# Patient Record
Sex: FEMALE | Race: BLACK OR AFRICAN AMERICAN | NOT HISPANIC OR LATINO | Employment: FULL TIME | URBAN - METROPOLITAN AREA
[De-identification: names, ages, dates, MRNs, and addresses within clinical notes are randomized per-mention and may not be internally consistent; named-entity substitution may affect disease eponyms.]

---

## 2020-11-17 ENCOUNTER — TELEPHONE (OUTPATIENT)
Dept: ORTHOPEDICS | Facility: CLINIC | Age: 25
End: 2020-11-17

## 2023-07-13 ENCOUNTER — LAB VISIT (OUTPATIENT)
Dept: LAB | Facility: HOSPITAL | Age: 28
End: 2023-07-13
Attending: INTERNAL MEDICINE
Payer: MEDICAID

## 2023-07-13 ENCOUNTER — OFFICE VISIT (OUTPATIENT)
Dept: RHEUMATOLOGY | Facility: CLINIC | Age: 28
End: 2023-07-13
Payer: MEDICAID

## 2023-07-13 VITALS
BODY MASS INDEX: 38.56 KG/M2 | WEIGHT: 217.63 LBS | HEIGHT: 63 IN | DIASTOLIC BLOOD PRESSURE: 89 MMHG | SYSTOLIC BLOOD PRESSURE: 137 MMHG | HEART RATE: 82 BPM

## 2023-07-13 DIAGNOSIS — M32.9 SYSTEMIC LUPUS ERYTHEMATOSUS, UNSPECIFIED SLE TYPE, UNSPECIFIED ORGAN INVOLVEMENT STATUS: Primary | ICD-10-CM

## 2023-07-13 DIAGNOSIS — M32.9 SYSTEMIC LUPUS ERYTHEMATOSUS, UNSPECIFIED SLE TYPE, UNSPECIFIED ORGAN INVOLVEMENT STATUS: ICD-10-CM

## 2023-07-13 DIAGNOSIS — M25.50 ARTHRALGIA, UNSPECIFIED JOINT: ICD-10-CM

## 2023-07-13 DIAGNOSIS — K21.9 GASTROESOPHAGEAL REFLUX DISEASE, UNSPECIFIED WHETHER ESOPHAGITIS PRESENT: ICD-10-CM

## 2023-07-13 DIAGNOSIS — Z79.899 HIGH RISK MEDICATION USE: ICD-10-CM

## 2023-07-13 DIAGNOSIS — R53.83 FATIGUE, UNSPECIFIED TYPE: ICD-10-CM

## 2023-07-13 DIAGNOSIS — G89.4 CHRONIC PAIN SYNDROME: ICD-10-CM

## 2023-07-13 DIAGNOSIS — L30.9 ECZEMA, UNSPECIFIED TYPE: ICD-10-CM

## 2023-07-13 LAB
25(OH)D3+25(OH)D2 SERPL-MCNC: 11 NG/ML (ref 30–96)
ALBUMIN SERPL BCP-MCNC: 4 G/DL (ref 3.5–5.2)
ALP SERPL-CCNC: 73 U/L (ref 55–135)
ALT SERPL W/O P-5'-P-CCNC: 12 U/L (ref 10–44)
ANION GAP SERPL CALC-SCNC: 14 MMOL/L (ref 8–16)
AST SERPL-CCNC: 18 U/L (ref 10–40)
BASOPHILS # BLD AUTO: 0.03 K/UL (ref 0–0.2)
BASOPHILS NFR BLD: 0.5 % (ref 0–1.9)
BILIRUB SERPL-MCNC: 0.7 MG/DL (ref 0.1–1)
BUN SERPL-MCNC: 7 MG/DL (ref 6–20)
C3 SERPL-MCNC: 150 MG/DL (ref 50–180)
C4 SERPL-MCNC: 55 MG/DL (ref 11–44)
CALCIUM SERPL-MCNC: 9.8 MG/DL (ref 8.7–10.5)
CCP AB SER IA-ACNC: 0.7 U/ML
CHLORIDE SERPL-SCNC: 98 MMOL/L (ref 95–110)
CO2 SERPL-SCNC: 26 MMOL/L (ref 23–29)
CREAT SERPL-MCNC: 0.8 MG/DL (ref 0.5–1.4)
DIFFERENTIAL METHOD: ABNORMAL
EOSINOPHIL # BLD AUTO: 0.1 K/UL (ref 0–0.5)
EOSINOPHIL NFR BLD: 1.9 % (ref 0–8)
ERYTHROCYTE [DISTWIDTH] IN BLOOD BY AUTOMATED COUNT: 14.3 % (ref 11.5–14.5)
ERYTHROCYTE [SEDIMENTATION RATE] IN BLOOD BY PHOTOMETRIC METHOD: 62 MM/HR (ref 0–36)
EST. GFR  (NO RACE VARIABLE): >60 ML/MIN/1.73 M^2
FERRITIN SERPL-MCNC: 18 NG/ML (ref 20–300)
FOLATE SERPL-MCNC: 4.2 NG/ML (ref 4–24)
GLUCOSE SERPL-MCNC: 74 MG/DL (ref 70–110)
HBV CORE AB SERPL QL IA: NORMAL
HBV SURFACE AG SERPL QL IA: NORMAL
HCT VFR BLD AUTO: 38 % (ref 37–48.5)
HCV AB SERPL QL IA: NORMAL
HGB BLD-MCNC: 11.4 G/DL (ref 12–16)
IMM GRANULOCYTES # BLD AUTO: 0.01 K/UL (ref 0–0.04)
IMM GRANULOCYTES NFR BLD AUTO: 0.2 % (ref 0–0.5)
INSULIN COLLECTION INTERVAL: NORMAL
INSULIN SERPL-ACNC: 7.4 UU/ML
IRON SERPL-MCNC: 43 UG/DL (ref 30–160)
LYMPHOCYTES # BLD AUTO: 1.7 K/UL (ref 1–4.8)
LYMPHOCYTES NFR BLD: 28.7 % (ref 18–48)
MAGNESIUM SERPL-MCNC: 1.6 MG/DL (ref 1.6–2.6)
MCH RBC QN AUTO: 25.3 PG (ref 27–31)
MCHC RBC AUTO-ENTMCNC: 30 G/DL (ref 32–36)
MCV RBC AUTO: 84 FL (ref 82–98)
MONOCYTES # BLD AUTO: 0.3 K/UL (ref 0.3–1)
MONOCYTES NFR BLD: 5.2 % (ref 4–15)
NEUTROPHILS # BLD AUTO: 3.7 K/UL (ref 1.8–7.7)
NEUTROPHILS NFR BLD: 63.5 % (ref 38–73)
NRBC BLD-RTO: 0 /100 WBC
PHOSPHATE SERPL-MCNC: 4 MG/DL (ref 2.7–4.5)
PLATELET # BLD AUTO: 219 K/UL (ref 150–450)
PMV BLD AUTO: ABNORMAL FL (ref 9.2–12.9)
POTASSIUM SERPL-SCNC: 3.2 MMOL/L (ref 3.5–5.1)
PREALB SERPL-MCNC: 14 MG/DL (ref 20–43)
PROT SERPL-MCNC: 8.8 G/DL (ref 6–8.4)
PTH-INTACT SERPL-MCNC: 76.4 PG/ML (ref 9–77)
RBC # BLD AUTO: 4.5 M/UL (ref 4–5.4)
SATURATED IRON: 10 % (ref 20–50)
SODIUM SERPL-SCNC: 138 MMOL/L (ref 136–145)
TOTAL IRON BINDING CAPACITY: 420 UG/DL (ref 250–450)
TRANSFERRIN SERPL-MCNC: 284 MG/DL (ref 200–375)
VIT B12 SERPL-MCNC: >2000 PG/ML (ref 210–950)
WBC # BLD AUTO: 5.79 K/UL (ref 3.9–12.7)

## 2023-07-13 PROCEDURE — 86038 ANTINUCLEAR ANTIBODIES: CPT | Performed by: INTERNAL MEDICINE

## 2023-07-13 PROCEDURE — 83970 ASSAY OF PARATHORMONE: CPT | Performed by: INTERNAL MEDICINE

## 2023-07-13 PROCEDURE — 84446 ASSAY OF VITAMIN E: CPT | Performed by: INTERNAL MEDICINE

## 2023-07-13 PROCEDURE — 84100 ASSAY OF PHOSPHORUS: CPT | Performed by: INTERNAL MEDICINE

## 2023-07-13 PROCEDURE — 99205 OFFICE O/P NEW HI 60 MIN: CPT | Mod: 25,S$PBB,, | Performed by: INTERNAL MEDICINE

## 2023-07-13 PROCEDURE — 86225 DNA ANTIBODY NATIVE: CPT | Performed by: INTERNAL MEDICINE

## 2023-07-13 PROCEDURE — 84466 ASSAY OF TRANSFERRIN: CPT | Performed by: INTERNAL MEDICINE

## 2023-07-13 PROCEDURE — 82306 VITAMIN D 25 HYDROXY: CPT | Performed by: INTERNAL MEDICINE

## 2023-07-13 PROCEDURE — 87340 HEPATITIS B SURFACE AG IA: CPT | Performed by: INTERNAL MEDICINE

## 2023-07-13 PROCEDURE — 84134 ASSAY OF PREALBUMIN: CPT | Performed by: INTERNAL MEDICINE

## 2023-07-13 PROCEDURE — 99999 PR PBB SHADOW E&M-EST. PATIENT-LVL III: ICD-10-PCS | Mod: PBBFAC,,, | Performed by: INTERNAL MEDICINE

## 2023-07-13 PROCEDURE — 86706 HEP B SURFACE ANTIBODY: CPT | Performed by: INTERNAL MEDICINE

## 2023-07-13 PROCEDURE — 3075F SYST BP GE 130 - 139MM HG: CPT | Mod: CPTII,,, | Performed by: INTERNAL MEDICINE

## 2023-07-13 PROCEDURE — 99213 OFFICE O/P EST LOW 20 MIN: CPT | Mod: PBBFAC,PN | Performed by: INTERNAL MEDICINE

## 2023-07-13 PROCEDURE — 86235 NUCLEAR ANTIGEN ANTIBODY: CPT | Mod: 59 | Performed by: INTERNAL MEDICINE

## 2023-07-13 PROCEDURE — 84597 ASSAY OF VITAMIN K: CPT | Performed by: INTERNAL MEDICINE

## 2023-07-13 PROCEDURE — 82607 VITAMIN B-12: CPT | Performed by: INTERNAL MEDICINE

## 2023-07-13 PROCEDURE — 3008F BODY MASS INDEX DOCD: CPT | Mod: CPTII,,, | Performed by: INTERNAL MEDICINE

## 2023-07-13 PROCEDURE — 86162 COMPLEMENT TOTAL (CH50): CPT | Performed by: INTERNAL MEDICINE

## 2023-07-13 PROCEDURE — 85025 COMPLETE CBC W/AUTO DIFF WBC: CPT | Performed by: INTERNAL MEDICINE

## 2023-07-13 PROCEDURE — 36415 COLL VENOUS BLD VENIPUNCTURE: CPT | Mod: PO | Performed by: INTERNAL MEDICINE

## 2023-07-13 PROCEDURE — 96372 THER/PROPH/DIAG INJ SC/IM: CPT | Mod: PBBFAC,PN

## 2023-07-13 PROCEDURE — 3079F DIAST BP 80-89 MM HG: CPT | Mod: CPTII,,, | Performed by: INTERNAL MEDICINE

## 2023-07-13 PROCEDURE — 82746 ASSAY OF FOLIC ACID SERUM: CPT | Performed by: INTERNAL MEDICINE

## 2023-07-13 PROCEDURE — 3008F PR BODY MASS INDEX (BMI) DOCUMENTED: ICD-10-PCS | Mod: CPTII,,, | Performed by: INTERNAL MEDICINE

## 2023-07-13 PROCEDURE — 86039 ANTINUCLEAR ANTIBODIES (ANA): CPT | Performed by: INTERNAL MEDICINE

## 2023-07-13 PROCEDURE — 86704 HEP B CORE ANTIBODY TOTAL: CPT | Performed by: INTERNAL MEDICINE

## 2023-07-13 PROCEDURE — 85652 RBC SED RATE AUTOMATED: CPT | Performed by: INTERNAL MEDICINE

## 2023-07-13 PROCEDURE — 3075F PR MOST RECENT SYSTOLIC BLOOD PRESS GE 130-139MM HG: ICD-10-PCS | Mod: CPTII,,, | Performed by: INTERNAL MEDICINE

## 2023-07-13 PROCEDURE — 86235 NUCLEAR ANTIGEN ANTIBODY: CPT | Performed by: INTERNAL MEDICINE

## 2023-07-13 PROCEDURE — 83516 IMMUNOASSAY NONANTIBODY: CPT | Performed by: INTERNAL MEDICINE

## 2023-07-13 PROCEDURE — 3079F PR MOST RECENT DIASTOLIC BLOOD PRESSURE 80-89 MM HG: ICD-10-PCS | Mod: CPTII,,, | Performed by: INTERNAL MEDICINE

## 2023-07-13 PROCEDURE — 99205 PR OFFICE/OUTPT VISIT, NEW, LEVL V, 60-74 MIN: ICD-10-PCS | Mod: 25,S$PBB,, | Performed by: INTERNAL MEDICINE

## 2023-07-13 PROCEDURE — 83525 ASSAY OF INSULIN: CPT | Performed by: INTERNAL MEDICINE

## 2023-07-13 PROCEDURE — 1159F MED LIST DOCD IN RCRD: CPT | Mod: CPTII,,, | Performed by: INTERNAL MEDICINE

## 2023-07-13 PROCEDURE — 86160 COMPLEMENT ANTIGEN: CPT | Performed by: INTERNAL MEDICINE

## 2023-07-13 PROCEDURE — 99999 PR PBB SHADOW E&M-EST. PATIENT-LVL III: CPT | Mod: PBBFAC,,, | Performed by: INTERNAL MEDICINE

## 2023-07-13 PROCEDURE — 86706 HEP B SURFACE ANTIBODY: CPT | Mod: 91 | Performed by: INTERNAL MEDICINE

## 2023-07-13 PROCEDURE — 84425 ASSAY OF VITAMIN B-1: CPT | Performed by: INTERNAL MEDICINE

## 2023-07-13 PROCEDURE — 86200 CCP ANTIBODY: CPT | Performed by: INTERNAL MEDICINE

## 2023-07-13 PROCEDURE — 86480 TB TEST CELL IMMUN MEASURE: CPT | Performed by: INTERNAL MEDICINE

## 2023-07-13 PROCEDURE — 83735 ASSAY OF MAGNESIUM: CPT | Performed by: INTERNAL MEDICINE

## 2023-07-13 PROCEDURE — 84630 ASSAY OF ZINC: CPT | Performed by: INTERNAL MEDICINE

## 2023-07-13 PROCEDURE — 83540 ASSAY OF IRON: CPT | Performed by: INTERNAL MEDICINE

## 2023-07-13 PROCEDURE — 86803 HEPATITIS C AB TEST: CPT | Performed by: INTERNAL MEDICINE

## 2023-07-13 PROCEDURE — 1159F PR MEDICATION LIST DOCUMENTED IN MEDICAL RECORD: ICD-10-PCS | Mod: CPTII,,, | Performed by: INTERNAL MEDICINE

## 2023-07-13 PROCEDURE — 86160 COMPLEMENT ANTIGEN: CPT | Mod: 59 | Performed by: INTERNAL MEDICINE

## 2023-07-13 PROCEDURE — 80053 COMPREHEN METABOLIC PANEL: CPT | Performed by: INTERNAL MEDICINE

## 2023-07-13 PROCEDURE — 82728 ASSAY OF FERRITIN: CPT | Performed by: INTERNAL MEDICINE

## 2023-07-13 RX ORDER — ONDANSETRON 4 MG/1
4 TABLET, ORALLY DISINTEGRATING ORAL EVERY 8 HOURS PRN
Qty: 40 TABLET | Refills: 3 | Status: SHIPPED | OUTPATIENT
Start: 2023-07-13 | End: 2023-12-05 | Stop reason: SDUPTHER

## 2023-07-13 RX ORDER — TRIAMCINOLONE ACETONIDE 1 MG/G
OINTMENT TOPICAL 2 TIMES DAILY
Qty: 80 G | Refills: 3 | Status: SHIPPED | OUTPATIENT
Start: 2023-07-13 | End: 2023-11-24 | Stop reason: SDUPTHER

## 2023-07-13 RX ORDER — OXYCODONE AND ACETAMINOPHEN 7.5; 325 MG/1; MG/1
1 TABLET ORAL EVERY 8 HOURS PRN
Qty: 21 TABLET | Refills: 0 | Status: SHIPPED | OUTPATIENT
Start: 2023-07-13 | End: 2023-07-20

## 2023-07-13 RX ORDER — METHYLPREDNISOLONE ACETATE 80 MG/ML
160 INJECTION, SUSPENSION INTRA-ARTICULAR; INTRALESIONAL; INTRAMUSCULAR; SOFT TISSUE
Status: COMPLETED | OUTPATIENT
Start: 2023-07-13 | End: 2023-07-13

## 2023-07-13 RX ORDER — HYDROXYCHLOROQUINE SULFATE 200 MG/1
200 TABLET, FILM COATED ORAL 2 TIMES DAILY
Qty: 60 TABLET | Refills: 5 | Status: SHIPPED | OUTPATIENT
Start: 2023-07-13 | End: 2023-12-05 | Stop reason: SDUPTHER

## 2023-07-13 RX ORDER — SEMAGLUTIDE 0.68 MG/ML
0.5 INJECTION, SOLUTION SUBCUTANEOUS
COMMUNITY
Start: 2023-03-23 | End: 2024-01-23

## 2023-07-13 RX ORDER — OXYCODONE AND ACETAMINOPHEN 7.5; 325 MG/1; MG/1
1 TABLET ORAL EVERY 12 HOURS PRN
Qty: 60 TABLET | Refills: 0 | Status: SHIPPED | OUTPATIENT
Start: 2023-07-13 | End: 2023-08-14 | Stop reason: SDUPTHER

## 2023-07-13 RX ORDER — CYANOCOBALAMIN 1000 UG/ML
1000 INJECTION, SOLUTION INTRAMUSCULAR; SUBCUTANEOUS
Status: COMPLETED | OUTPATIENT
Start: 2023-07-13 | End: 2023-07-13

## 2023-07-13 RX ORDER — KETOROLAC TROMETHAMINE 30 MG/ML
60 INJECTION, SOLUTION INTRAMUSCULAR; INTRAVENOUS
Status: COMPLETED | OUTPATIENT
Start: 2023-07-13 | End: 2023-07-13

## 2023-07-13 RX ORDER — BELIMUMAB 200 MG/ML
200 SOLUTION SUBCUTANEOUS
Qty: 4 ML | Refills: 11 | Status: ACTIVE | OUTPATIENT
Start: 2023-07-13 | End: 2024-01-16 | Stop reason: SDUPTHER

## 2023-07-13 RX ORDER — PANTOPRAZOLE SODIUM 40 MG/1
40 TABLET, DELAYED RELEASE ORAL DAILY
Qty: 30 TABLET | Refills: 11 | Status: SHIPPED | OUTPATIENT
Start: 2023-07-13 | End: 2023-12-05 | Stop reason: SDUPTHER

## 2023-07-13 RX ADMIN — CYANOCOBALAMIN 1000 MCG: 1000 INJECTION, SOLUTION INTRAMUSCULAR at 11:07

## 2023-07-13 RX ADMIN — KETOROLAC TROMETHAMINE 60 MG: 60 INJECTION, SOLUTION INTRAMUSCULAR at 11:07

## 2023-07-13 RX ADMIN — METHYLPREDNISOLONE ACETATE 160 MG: 80 INJECTION, SUSPENSION INTRA-ARTICULAR; INTRALESIONAL; INTRAMUSCULAR; SOFT TISSUE at 11:07

## 2023-07-13 ASSESSMENT — ROUTINE ASSESSMENT OF PATIENT INDEX DATA (RAPID3)
MDHAQ FUNCTION SCORE: 0.4
FATIGUE SCORE: 2.2
PAIN SCORE: 5.5
PATIENT GLOBAL ASSESSMENT SCORE: 5.5
TOTAL RAPID3 SCORE: 4.11
PSYCHOLOGICAL DISTRESS SCORE: 0

## 2023-07-13 NOTE — PROGRESS NOTES
Subjective:      Patient ID: Sarahy Hameed is a 27 y.o. female.    Chief Complaint: Disease Management    HPI: pt is a 28 yo female with a family hx  ra and sle , sjogren grandmother,  she had had a gastric sleeve 4/2023  Patient complains of arthralgias and myalgias for which has been present for a few years. Pain is located in multiple joints, both shoulder(s), both elbow(s), both wrist(s), both MCP(s): 1st, 2nd, 3rd, 4th and 5th, both PIP(s): 1st, 2nd, 3rd, 4th and 5th, both DIP(s): 1st and 2nd, both hip(s), both knee(s) and both MTP(s): 1st, 2nd, 3rd, 4th and 5th, is described as aching, pulsating, shooting and throbbing, and is constant, moderate .  Associated symptoms include: crepitation, decreased range of motion, edema, effusion, tenderness and warmth. . No mouth ulcer, plus malar rash, alopecia, joint pain, leg swelling, am gelling, plus thrush      Disease Management HPI      Lupus Erythematosis    The disease course has been worsening. The condition has lasted for 8 years.     She complains of pain, stiffness, joint swelling and joint warmth. The symptoms have been worsening. Affected locations include the neck, right shoulder, left elbow, left shoulder, right ankle, right foot, right hip, right DIP, right PIP, right MCP, left PIP, left DIP, left hip, left knee, left ankle, left foot and left toes. Associated symptoms include fatigue, pain at night, pain while resting, myalgias, dry eyes, stress and weight loss. Pertinent negatives include no dysuria, fever, trouble swallowing or headaches. She complains of morning stiffness.    Factors aggravating her arthritis include activity.   Review of Systems   Constitutional:  Positive for chills, fatigue and weight loss. Negative for activity change, appetite change, diaphoresis, fever and unexpected weight change.   HENT:  Negative for congestion, dental problem, ear discharge, ear pain, facial swelling, mouth sores, nosebleeds, postnasal drip, rhinorrhea,  "sinus pressure, sneezing, sore throat, tinnitus, trouble swallowing and voice change.    Eyes:  Negative for photophobia, pain, discharge, redness and itching.   Respiratory:  Positive for cough. Negative for apnea, chest tightness, shortness of breath and wheezing.    Cardiovascular:  Positive for leg swelling. Negative for chest pain and palpitations.   Gastrointestinal:  Positive for abdominal pain. Negative for abdominal distention, constipation, diarrhea, nausea and vomiting.   Endocrine: Negative for cold intolerance, heat intolerance, polydipsia and polyuria.   Genitourinary:  Negative for decreased urine volume, difficulty urinating, dysuria, flank pain, frequency, hematuria and urgency.   Musculoskeletal:  Positive for arthralgias, back pain, gait problem, joint swelling, myalgias, neck pain, neck stiffness and stiffness.   Skin:  Negative for pallor, rash and wound.   Allergic/Immunologic: Negative for immunocompromised state.   Neurological:  Negative for dizziness, tremors, numbness and headaches.   Hematological:  Negative for adenopathy. Does not bruise/bleed easily.   Psychiatric/Behavioral:  Negative for sleep disturbance. The patient is not nervous/anxious.       Objective:   /89   Pulse 82   Ht 5' 3" (1.6 m)   Wt 98.7 kg (217 lb 9.5 oz)   BMI 38.55 kg/m²   Physical Exam   Constitutional: She is oriented to person, place, and time.   HENT:   Head: Normocephalic and atraumatic.   Mouth/Throat: Oropharynx is clear and moist.   Eyes: Pupils are equal, round, and reactive to light.   Neck: No thyromegaly present.   Cardiovascular: Normal rate, regular rhythm and normal heart sounds. Exam reveals no gallop and no friction rub.   No murmur heard.  Pulmonary/Chest: Breath sounds normal. She has no wheezes. She has no rales. She exhibits no tenderness.   Abdominal: There is no abdominal tenderness. There is no rebound and no guarding.   Musculoskeletal:      Right shoulder: Tenderness present.     "  Left shoulder: Tenderness present.      Right elbow: Normal.      Left elbow: Tenderness present.      Right wrist: Tenderness present.      Left wrist: Tenderness present.      Cervical back: Neck supple.      Right knee: Effusion present. Tenderness present.      Left knee: Effusion present. Tenderness present.   Lymphadenopathy:     She has no cervical adenopathy.   Neurological: She is alert and oriented to person, place, and time. Gait normal.   Skin: Rash noted. There is erythema. There is pallor.   Psychiatric: Mood, memory, affect and judgment normal.   Vitals reviewed.      Right Side Rheumatological Exam     Examination finds the elbow normal.    The patient is tender to palpation of the shoulder, wrist, knee, 1st PIP, 1st MCP, 2nd PIP, 2nd MCP, 3rd PIP, 3rd MCP, 4th PIP, 4th MCP, 5th PIP and 5th MCP    Shoulder Exam   Tenderness Location: acromion    Range of Motion   Active abduction:  abnormal   Adduction: abnormal  Sensation: normal    Knee Exam   Tenderness Location: medial joint line and LCL  Patellofemoral Crepitus: positive  Effusion: positive  Sensation: normal    Hip Exam   Tenderness Location: posterior and lateral  Sensation: normal    Elbow/Wrist Exam   Tenderness Location: no tenderness  Sensation: normal    Left Side Rheumatological Exam     The patient is tender to palpation of the shoulder, elbow, wrist, knee, 1st PIP, 1st MCP, 2nd PIP, 2nd MCP, 3rd PIP, 3rd MCP, 4th PIP, 4th MCP, 5th PIP and 5th MCP.    Shoulder Exam   Tenderness Location: acromion    Range of Motion   Active abduction:  abnormal   Sensation: normal    Knee Exam   Tenderness Location: lateral joint line and medial joint line    Patellofemoral Crepitus: positive  Effusion: positive  Sensation: normal    Hip Exam   Tenderness Location: posterior and lateral  Sensation: normal    Elbow/Wrist Exam   Sensation: normal      Back/Neck Exam   Tenderness Right paramedian tenderness of the Upper C-Spine, Upper T-Spine, Upper  L-Spine and SI Joint.Left paramedian tenderness of the Upper C-Spine, Upper T-Spine, SI Joint and Upper L-Spine.    Test Requested NEGATIVE see below    Comment: ANTINUCLEAR ANTIBODY, TITER AND PATTERN   WINSOME NEGATIVE POSITIVE Abnormal     Comment: WINSOME IFA is a first line screen for detecting the presence of   up to approximately 150 autoantibodies in various autoimmune   diseases. A positive WINSOME IFA result is suggestive of   autoimmune disease and reflexes to titer and pattern.   Further laboratory testing may be considered if clinically   indicated.   For additional information, please refer to   http://education.SurgeryEdu/faq/BIX520   (This link is being provided for information/educational   purposes only.)   WINSOME Titer  >=1:1280 Abnormal     Comment: Reference Ranges for Anti-Nuclear Ab Titer:      <1:40      Negative      1:40-1:80  Low Antibody Level      >1:80      Elevated Antibody Level   WINSOME Pattern  SEE BELOW    Comment: NUCLEAR, NUCLEOLAR   Nucleolar pattern is associated with systemic sclerosis   (scleroderma), systemic sclerosis/polymyositis overlap and   Sjogren's syndrome.   AC-8, 9, 10: Nucleolar   International Consensus on WINSOME Patterns   https://doi.org/10.1515/ysty-7864-0792   Test Performed By:  [facility] [place]   Specimen Collected: 06/09/21 06:33 Last Resulted: 06/12/21 02:51   [facility]  Result Received: 07/13/23 08:33    View Encounter      Received Information   Contains abnormal data C-REACTIVE PROTEIN  Order: [id number]   suggestion  Result Information displayed in this report will not trend and may not trigger automated decision support.      Ref Range & Units 2 yr ago   CRP 0.0 - 9.9 mg/L 29.0 High     Specimen Collected: 06/09/21 06:33 Last Resulted: 06/09/21 07:14   Received From: [facility]  Result Received: 07/13/23 08:33    View Encounter      Received  Information  Rheumatoid factor  Order: [id number]   suggestion  Information displayed in this report may not trend or trigger automated decision support.      Ref Range & Units 2 yr ago   RA Latex NEGATIVE NEGATIVE    Specimen Collected: 06/09/21 06:33 Last Resulted: 06/09/21 20:13   Received From: [facility]  Result Received: 07/13/23 08:33    View Encounter      Received Information   Contains abnormal data BASIC METABOLIC PANEL  Order: [id number]  Collected 6/9/2021 05:50          Component Ref Range & Units 2 yr ago   Glucose 65 - 99 mg/dL 97    Sodium 136 - 144 mmol/L 137    Potassium 3.6 - 5.1 mmol/L 4.2    Chloride 101 - 111 mmol/L 105    CO2 22 - 32 mmol/L 25    Blood Urea Nitrogen 8 - 20 mg/dL 8    Calcium 8.9 - 10.3 mg/dL 9.0    Creatinine 0.60 - 1.10 mg/dL 0.56 Low     Anion Gap 7 - 16 mmol/L 7         View Full Report                Glomerular Filtration Rate  Order: [id number]   suggestion  Information displayed in this report may not trend or trigger automated decision support.      Ref Range & Units 2 yr ago   GFR Non  >59 mL/min >60    GFR African American >59 mL/min >60    Comment:              STAGES OF CHRONIC KIDNEY DISEASE   STAGE          DESCRIPTION           GFR(mL/min/1.73 m2)   3         Moderate decrease GFR            30-59   4           Severe decrease GFR            15-29   5              Kidney Failure         <15 (or dialysis)   Chronic kidney disease is defined as either kidney damage   or GFR <60mL/min/1.73 m2 for >=3 months. Kidney damage is   defined as pathologic abnormalities or markers of damage,   including abnormalities in blood or urine tests or imaging   studies.   GFR is not calculated for patients under the age of 18.   Specimen Collected: 06/09/21 05:50 Last Resulted: 06/09/21 06:32   Received From: [place] [facility]  Result Received: 07/13/23 08:33    View Encounter      Received Information  VITAMIN B12  Order: [id number]   suggestion   Result Information displayed in this report will not trend and may not trigger automated decision support.      Ref Range & Units 2 yr ago   Vitamin B-12 168 - 523 pg/mL 324    Specimen Collected: 06/08/21 09:14 Last Resulted: 06/08/21 10:06   Received From: [facility]  Result Received: 07/13/23 08:33    View Encounter      Received Information   Contains abnormal data SEDIMENTATION RATE, AUTOMATED  Order: [id number]   suggestion  Result Information displayed in this report will not trend and may not trigger automated decision support.      Ref Range & Units 2 yr ago   Sed Rate 0 - 20 92 High     Specimen Collected: 06/08/21 09:14 Last Resulted: 06/08/21 09:22   Received From: [facility]  Result Received: 07/13/23 08:33    View Encounter      Received Information  RPR with Reflex to Titer  Order: [id number]   suggestion  Information displayed in this report may not trend or trigger automated decision support.      Ref Range & Units 2 yr ago   RPR NON REACTIVE NON REACT    Comment: The results of a positive RPR test should be confirmed by a   treponemal test if clinically indicated.   Specimen Collected: 06/08/21 09:14 Last Resulted: 06/08/21 14:09   [facility]  Result Received: 07/13/23 08:33    View Encounter      Received Information  HIV 1/2 Antigen & Antibodies, 4th Gen w/Reflex  Order: [id number]   suggestion  Information displayed in this report may not trend or trigger automated decision support.      Ref Range & Units 2 yr ago   HIV Ag/Ab, 4th Gen NONREACTIVE NONREACTIVE    Comment: Although HIV-1 antigen and HIV-1/HIV-2 antibodies were not   detected, a nonreactive HIV Ag/Ab result does not completely   exclude HIV infection since the time frame for   seroconversion is variable. If acute HIV infection is   suspected, an HIV-1 Qualitative TMA test is recommended.   Specimen Collected: 06/08/21 09:14 Last Resulted: 06/08/21 13:21   Received From: [place]  [facility]  Result Received: 07/13/23 08:33    View Encounter      Received Information  FOLATE  Order: [id number]   suggestion  Result Information displayed in this report will not trend and may not trigger automated decision support.      Ref Range & Units 2 yr ago   Folate >5.8 ng/mL 6.5    Specimen Collected: 06/08/21 09:13 Last Resulted: 06/08/21 10:57   Received From: [facility]  Result Received: 07/13/23 08:33    View Encounter      Received Information  LIPID PANEL  Order: [id number]  Collected 6/8/2021 06:23          Component Ref Range & Units 2 yr ago   Cholesterol 0 - 199 mg/dL 147    Triglycerides 0 - 199 mg/dL 71    HDL 29 - 89 mg/dL 42    LDL Calculated 0 - 109 mg/dL 91    Hdl/Cholesterol Ratio 0.0 - 4.5 3.5         View Full Report     Narrative    PHLEBOTOMY UNSUCCESSFUL.              TSH  Order: [id number]   suggestion  Result Information displayed in this report will not trend and may not trigger automated decision support.      Ref Range & Units 2 yr ago   TSH 0.34 - 5.60 u[IU]/mL 2.09    Specimen Collected: 06/08/21 06:23 Last Resulted: 06/08/21 07:55   [facility]  Result Received: 07/13/23 08:33    View Encounter      Received Information       Assessment:     1. Systemic lupus erythematosus, unspecified SLE type, unspecified organ involvement status    2. Eczema, unspecified type    3. High risk medication use    4. Arthralgia, unspecified joint    5. Fatigue, unspecified type    6. Chronic pain syndrome    7. Gastroesophageal reflux disease, unspecified whether esophagitis present          Plan:   Sarahy was seen today for disease management.    Diagnoses and all orders for this visit:    Systemic lupus erythematosus, unspecified SLE type, unspecified organ involvement status  -     Insulin, Random; Future  -     Prealbumin; Future  -     Ferritin; Future  -     Iron and TIBC; Future  -     Vitamin K; Future  -     Vitamin E; Future  -      Vitamin D; Future  -     Zinc; Future  -     PTH, Intact; Future  -     Magnesium; Future  -     Phosphorus; Future  -     Folate; Future  -     Vitamin B12; Future  -     Vitamin B6; Future  -     Vitamin B1; Future  -     Complement, Total; Future  -     C4 Complement; Future  -     C3 Complement; Future  -     Sedimentation rate; Future  -     Sjogrens syndrome-B extractable nuclear antibody; Future  -     Sjogrens syndrome-A extractable nuclear antibody; Future  -     Comprehensive Metabolic Panel; Future  -     CBC Auto Differential; Future  -     Anti-Smith Antibody; Future  -     Anti-Scleroderma Antibody; Future  -     Anti-Histone Antibody; Future  -     Anti-DNA Ab, Double-Stranded; Future  -     Anti Sm/RNP Antibody; Future  -     WINSOME Screen w/Reflex; Future  -     Hepatitis C Antibody; Future  -     Quantiferon Gold TB; Future  -     Hepatitis B Surface Antigen; Future  -     Hepatitis B Surface Antibody, Qual/Quant; Future  -     Hepatitis B Core Antibody, Total; Future  -     Hepatitis B Surface Ab, Qualitative; Future  -     Cyclic Citrullinated Peptide Antibody, IgG; Future  -     pantoprazole (PROTONIX) 40 MG tablet; Take 1 tablet (40 mg total) by mouth once daily.  -     ondansetron (ZOFRAN-ODT) 4 MG TbDL; Take 1 tablet (4 mg total) by mouth every 8 (eight) hours as needed (n/v).  -     hydrOXYchloroQUINE (PLAQUENIL) 200 mg tablet; Take 1 tablet (200 mg total) by mouth 2 (two) times daily.  -     triamcinolone acetonide 0.1% (KENALOG) 0.1 % ointment; Apply topically 2 (two) times daily.  -     oxyCODONE-acetaminophen (PERCOCET) 7.5-325 mg per tablet; Take 1 tablet by mouth every 8 (eight) hours as needed for Pain.  -     oxyCODONE-acetaminophen (PERCOCET) 7.5-325 mg per tablet; Take 1 tablet by mouth every 12 (twelve) hours as needed for Pain.    Eczema, unspecified type  -     Insulin, Random; Future  -     Prealbumin; Future  -     Ferritin; Future  -     Iron and TIBC; Future  -     Vitamin K;  Future  -     Vitamin E; Future  -     Vitamin D; Future  -     Zinc; Future  -     PTH, Intact; Future  -     Magnesium; Future  -     Phosphorus; Future  -     Folate; Future  -     Vitamin B12; Future  -     Vitamin B6; Future  -     Vitamin B1; Future  -     Complement, Total; Future  -     C4 Complement; Future  -     C3 Complement; Future  -     Sedimentation rate; Future  -     Sjogrens syndrome-B extractable nuclear antibody; Future  -     Sjogrens syndrome-A extractable nuclear antibody; Future  -     Comprehensive Metabolic Panel; Future  -     CBC Auto Differential; Future  -     Anti-Smith Antibody; Future  -     Anti-Scleroderma Antibody; Future  -     Anti-Histone Antibody; Future  -     Anti-DNA Ab, Double-Stranded; Future  -     Anti Sm/RNP Antibody; Future  -     WINSOME Screen w/Reflex; Future  -     Hepatitis C Antibody; Future  -     Quantiferon Gold TB; Future  -     Hepatitis B Surface Antigen; Future  -     Hepatitis B Surface Antibody, Qual/Quant; Future  -     Hepatitis B Core Antibody, Total; Future  -     Hepatitis B Surface Ab, Qualitative; Future  -     pantoprazole (PROTONIX) 40 MG tablet; Take 1 tablet (40 mg total) by mouth once daily.  -     ondansetron (ZOFRAN-ODT) 4 MG TbDL; Take 1 tablet (4 mg total) by mouth every 8 (eight) hours as needed (n/v).  -     hydrOXYchloroQUINE (PLAQUENIL) 200 mg tablet; Take 1 tablet (200 mg total) by mouth 2 (two) times daily.  -     triamcinolone acetonide 0.1% (KENALOG) 0.1 % ointment; Apply topically 2 (two) times daily.  -     oxyCODONE-acetaminophen (PERCOCET) 7.5-325 mg per tablet; Take 1 tablet by mouth every 8 (eight) hours as needed for Pain.    High risk medication use  -     Insulin, Random; Future  -     Prealbumin; Future  -     Ferritin; Future  -     Iron and TIBC; Future  -     Vitamin K; Future  -     Vitamin E; Future  -     Vitamin D; Future  -     Zinc; Future  -     PTH, Intact; Future  -     Magnesium; Future  -     Phosphorus;  Future  -     Folate; Future  -     Vitamin B12; Future  -     Vitamin B6; Future  -     Vitamin B1; Future  -     Complement, Total; Future  -     C4 Complement; Future  -     C3 Complement; Future  -     Sedimentation rate; Future  -     Sjogrens syndrome-B extractable nuclear antibody; Future  -     Sjogrens syndrome-A extractable nuclear antibody; Future  -     Comprehensive Metabolic Panel; Future  -     CBC Auto Differential; Future  -     Anti-Smith Antibody; Future  -     Anti-Scleroderma Antibody; Future  -     Anti-Histone Antibody; Future  -     Anti-DNA Ab, Double-Stranded; Future  -     Anti Sm/RNP Antibody; Future  -     WINSOME Screen w/Reflex; Future  -     Hepatitis C Antibody; Future  -     Quantiferon Gold TB; Future  -     Hepatitis B Surface Antigen; Future  -     Hepatitis B Surface Antibody, Qual/Quant; Future  -     Hepatitis B Core Antibody, Total; Future  -     Hepatitis B Surface Ab, Qualitative; Future  -     pantoprazole (PROTONIX) 40 MG tablet; Take 1 tablet (40 mg total) by mouth once daily.  -     ondansetron (ZOFRAN-ODT) 4 MG TbDL; Take 1 tablet (4 mg total) by mouth every 8 (eight) hours as needed (n/v).  -     hydrOXYchloroQUINE (PLAQUENIL) 200 mg tablet; Take 1 tablet (200 mg total) by mouth 2 (two) times daily.  -     triamcinolone acetonide 0.1% (KENALOG) 0.1 % ointment; Apply topically 2 (two) times daily.  -     oxyCODONE-acetaminophen (PERCOCET) 7.5-325 mg per tablet; Take 1 tablet by mouth every 8 (eight) hours as needed for Pain.  -     oxyCODONE-acetaminophen (PERCOCET) 7.5-325 mg per tablet; Take 1 tablet by mouth every 12 (twelve) hours as needed for Pain.    Arthralgia, unspecified joint  -     Insulin, Random; Future  -     Prealbumin; Future  -     Ferritin; Future  -     Iron and TIBC; Future  -     Vitamin K; Future  -     Vitamin E; Future  -     Vitamin D; Future  -     Zinc; Future  -     PTH, Intact; Future  -     Magnesium; Future  -     Phosphorus; Future  -      Folate; Future  -     Vitamin B12; Future  -     Vitamin B6; Future  -     Vitamin B1; Future  -     Complement, Total; Future  -     C4 Complement; Future  -     C3 Complement; Future  -     Sedimentation rate; Future  -     Sjogrens syndrome-B extractable nuclear antibody; Future  -     Sjogrens syndrome-A extractable nuclear antibody; Future  -     Comprehensive Metabolic Panel; Future  -     CBC Auto Differential; Future  -     Anti-Smith Antibody; Future  -     Anti-Scleroderma Antibody; Future  -     Anti-Histone Antibody; Future  -     Anti-DNA Ab, Double-Stranded; Future  -     Anti Sm/RNP Antibody; Future  -     WINSOME Screen w/Reflex; Future  -     Hepatitis C Antibody; Future  -     Quantiferon Gold TB; Future  -     Hepatitis B Surface Antigen; Future  -     Hepatitis B Surface Antibody, Qual/Quant; Future  -     Hepatitis B Core Antibody, Total; Future  -     Hepatitis B Surface Ab, Qualitative; Future  -     pantoprazole (PROTONIX) 40 MG tablet; Take 1 tablet (40 mg total) by mouth once daily.  -     ondansetron (ZOFRAN-ODT) 4 MG TbDL; Take 1 tablet (4 mg total) by mouth every 8 (eight) hours as needed (n/v).  -     hydrOXYchloroQUINE (PLAQUENIL) 200 mg tablet; Take 1 tablet (200 mg total) by mouth 2 (two) times daily.  -     triamcinolone acetonide 0.1% (KENALOG) 0.1 % ointment; Apply topically 2 (two) times daily.  -     oxyCODONE-acetaminophen (PERCOCET) 7.5-325 mg per tablet; Take 1 tablet by mouth every 8 (eight) hours as needed for Pain.    Fatigue, unspecified type  -     Insulin, Random; Future  -     Prealbumin; Future  -     Ferritin; Future  -     Iron and TIBC; Future  -     Vitamin K; Future  -     Vitamin E; Future  -     Vitamin D; Future  -     Zinc; Future  -     PTH, Intact; Future  -     Magnesium; Future  -     Phosphorus; Future  -     Folate; Future  -     Vitamin B12; Future  -     Vitamin B6; Future  -     Vitamin B1; Future  -     Complement, Total; Future  -     C4 Complement;  Future  -     C3 Complement; Future  -     Sedimentation rate; Future  -     Sjogrens syndrome-B extractable nuclear antibody; Future  -     Sjogrens syndrome-A extractable nuclear antibody; Future  -     Comprehensive Metabolic Panel; Future  -     CBC Auto Differential; Future  -     Anti-Smith Antibody; Future  -     Anti-Scleroderma Antibody; Future  -     Anti-Histone Antibody; Future  -     Anti-DNA Ab, Double-Stranded; Future  -     Anti Sm/RNP Antibody; Future  -     WINSOME Screen w/Reflex; Future  -     Hepatitis C Antibody; Future  -     Quantiferon Gold TB; Future  -     Hepatitis B Surface Antigen; Future  -     Hepatitis B Surface Antibody, Qual/Quant; Future  -     Hepatitis B Core Antibody, Total; Future  -     Hepatitis B Surface Ab, Qualitative; Future  -     pantoprazole (PROTONIX) 40 MG tablet; Take 1 tablet (40 mg total) by mouth once daily.  -     ondansetron (ZOFRAN-ODT) 4 MG TbDL; Take 1 tablet (4 mg total) by mouth every 8 (eight) hours as needed (n/v).  -     hydrOXYchloroQUINE (PLAQUENIL) 200 mg tablet; Take 1 tablet (200 mg total) by mouth 2 (two) times daily.  -     triamcinolone acetonide 0.1% (KENALOG) 0.1 % ointment; Apply topically 2 (two) times daily.  -     oxyCODONE-acetaminophen (PERCOCET) 7.5-325 mg per tablet; Take 1 tablet by mouth every 8 (eight) hours as needed for Pain.    Chronic pain syndrome  -     Insulin, Random; Future  -     Prealbumin; Future  -     Ferritin; Future  -     Iron and TIBC; Future  -     Vitamin K; Future  -     Vitamin E; Future  -     Vitamin D; Future  -     Zinc; Future  -     PTH, Intact; Future  -     Magnesium; Future  -     Phosphorus; Future  -     Folate; Future  -     Vitamin B12; Future  -     Vitamin B6; Future  -     Vitamin B1; Future  -     Complement, Total; Future  -     C4 Complement; Future  -     C3 Complement; Future  -     Sedimentation rate; Future  -     Sjogrens syndrome-B extractable nuclear antibody; Future  -     Sjogrens  syndrome-A extractable nuclear antibody; Future  -     Comprehensive Metabolic Panel; Future  -     CBC Auto Differential; Future  -     Anti-Smith Antibody; Future  -     Anti-Scleroderma Antibody; Future  -     Anti-Histone Antibody; Future  -     Anti-DNA Ab, Double-Stranded; Future  -     Anti Sm/RNP Antibody; Future  -     WINSOME Screen w/Reflex; Future  -     Hepatitis C Antibody; Future  -     Quantiferon Gold TB; Future  -     Hepatitis B Surface Antigen; Future  -     Hepatitis B Surface Antibody, Qual/Quant; Future  -     Hepatitis B Core Antibody, Total; Future  -     Hepatitis B Surface Ab, Qualitative; Future  -     pantoprazole (PROTONIX) 40 MG tablet; Take 1 tablet (40 mg total) by mouth once daily.  -     ondansetron (ZOFRAN-ODT) 4 MG TbDL; Take 1 tablet (4 mg total) by mouth every 8 (eight) hours as needed (n/v).  -     hydrOXYchloroQUINE (PLAQUENIL) 200 mg tablet; Take 1 tablet (200 mg total) by mouth 2 (two) times daily.  -     triamcinolone acetonide 0.1% (KENALOG) 0.1 % ointment; Apply topically 2 (two) times daily.  -     oxyCODONE-acetaminophen (PERCOCET) 7.5-325 mg per tablet; Take 1 tablet by mouth every 8 (eight) hours as needed for Pain.  -     oxyCODONE-acetaminophen (PERCOCET) 7.5-325 mg per tablet; Take 1 tablet by mouth every 12 (twelve) hours as needed for Pain.    Gastroesophageal reflux disease, unspecified whether esophagitis present  -     pantoprazole (PROTONIX) 40 MG tablet; Take 1 tablet (40 mg total) by mouth once daily.  -     ondansetron (ZOFRAN-ODT) 4 MG TbDL; Take 1 tablet (4 mg total) by mouth every 8 (eight) hours as needed (n/v).  -     hydrOXYchloroQUINE (PLAQUENIL) 200 mg tablet; Take 1 tablet (200 mg total) by mouth 2 (two) times daily.  -     triamcinolone acetonide 0.1% (KENALOG) 0.1 % ointment; Apply topically 2 (two) times daily.  -     oxyCODONE-acetaminophen (PERCOCET) 7.5-325 mg per tablet; Take 1 tablet by mouth every 8 (eight) hours as needed for Pain.        Start plaquenil  Start benlysta  Add protonix, zofran   Ordered perocet pt is allergic to tramadol and norco caused n/v . I have  check [place] prescription monitoring program site and no unusual or abnormal behavior has occured  Pain aggreement signed  Labs asap    More than 50% of the 60 minute encounter was spent face to face counseling the patient regarding current status and future plan of care as well as side of the medications. All questions were answered to patient's satisfaction

## 2023-07-14 LAB
ANA PATTERN 1: NORMAL
ANA SER QL IF: POSITIVE
ANA TITR SER IF: NORMAL {TITER}
ANTI SM ANTIBODY: 0.16 RATIO (ref 0–0.99)
ANTI SM/RNP ANTIBODY: 0.1 RATIO (ref 0–0.99)
ANTI-SM INTERPRETATION: NEGATIVE
ANTI-SM/RNP INTERPRETATION: NEGATIVE
ANTI-SSA ANTIBODY: 0.11 RATIO (ref 0–0.99)
ANTI-SSA INTERPRETATION: NEGATIVE
ANTI-SSB ANTIBODY: 0.07 RATIO (ref 0–0.99)
ANTI-SSB INTERPRETATION: NEGATIVE
DSDNA AB SER-ACNC: NORMAL [IU]/ML
HBV SURFACE AB SER-ACNC: <3 MIU/ML
HBV SURFACE AB SER-ACNC: NORMAL M[IU]/ML

## 2023-07-15 ENCOUNTER — TELEPHONE (OUTPATIENT)
Dept: PHARMACY | Facility: CLINIC | Age: 28
End: 2023-07-15
Payer: MEDICAID

## 2023-07-15 LAB
GAMMA INTERFERON BACKGROUND BLD IA-ACNC: 0.03 IU/ML
HISTONE IGG SER IA-ACNC: 0.4 UNITS (ref 0–0.9)
M TB IFN-G CD4+ BCKGRND COR BLD-ACNC: -0 IU/ML
M TB IFN-G CD4+ BCKGRND COR BLD-ACNC: 0.02 IU/ML
MITOGEN IGNF BCKGRD COR BLD-ACNC: 9.97 IU/ML
TB GOLD PLUS: NEGATIVE

## 2023-07-15 NOTE — TELEPHONE ENCOUNTER
Hello, this is Doe Kumar, clinical pharmacist with [facility] that is part of your care team.  We have begun working on your prescription, Benlysta, that your doctor has sent us. Our next steps include:     Working with your insurance company to obtain approval for your medication  Working with you to ensure your medication is affordable     We will be calling you along the way with updates on your medication but if you have any concerns or receive information that you would like to discuss please reach us at [phone].    Welcome call outcome: Patient/caregiver reached

## 2023-07-17 LAB
CH50 SERPL-ACNC: >95 U/ML (ref 42–95)
ENA SCL70 AB SER-ACNC: <0.6 U/ML
HBV SURFACE AB SER QL IA: NEGATIVE
HBV SURFACE AB SERPL IA-ACNC: <3 MIU/ML
ZINC SERPL-MCNC: 74 UG/DL (ref 60–130)

## 2023-07-18 LAB
A-TOCOPHEROL VIT E SERPL-MCNC: 638 UG/DL (ref 500–1800)
VIT B1 BLD-MCNC: 28 UG/L (ref 38–122)

## 2023-07-19 ENCOUNTER — SPECIALTY PHARMACY (OUTPATIENT)
Dept: PHARMACY | Facility: CLINIC | Age: 28
End: 2023-07-19
Payer: MEDICAID

## 2023-07-19 NOTE — TELEPHONE ENCOUNTER
Tesha SANTAMARIA approved 7/18/23 to 7/17/24  Case ID: [id number]    Copay $3.00 - Forward to initial

## 2023-07-20 ENCOUNTER — SPECIALTY PHARMACY (OUTPATIENT)
Dept: PHARMACY | Facility: CLINIC | Age: 28
End: 2023-07-20
Payer: MEDICAID

## 2023-07-20 DIAGNOSIS — M32.9 LUPUS: Primary | ICD-10-CM

## 2023-07-20 LAB — PHYTONADIONE SERPL-MCNC: 0.16 NMOL/L (ref 0.22–4.88)

## 2023-07-24 ENCOUNTER — PATIENT MESSAGE (OUTPATIENT)
Dept: RESEARCH | Facility: HOSPITAL | Age: 28
End: 2023-07-24
Payer: MEDICAID

## 2023-07-31 NOTE — TELEPHONE ENCOUNTER
Call disconnected during the initial consultation. Attempted to call the patient back, but the call rang to voicemail. Voicemailbox was full.

## 2023-08-01 NOTE — TELEPHONE ENCOUNTER
Attempted to contact the patient to complete the initial consultation. Someone answered the phone, but the reception was spotty and the call dropped.

## 2023-08-08 NOTE — TELEPHONE ENCOUNTER
Specialty Pharmacy - Initial Clinical Assessment    Specialty Medication Orders Linked to Encounter      [facility] has been unable to successfully reach this patient. Call attempts were made on 7/20, 7/24, 7/27, 7/31, and 8/01. We are unable to coordinate her initial delivery but would be happy to re-enroll her in our services should she call us. Thank you for allowing us to participate in her care.    Flowsheet Row Most Recent Value   Medication #1 belimumab (BENLYSTA) 200 mg/mL AtIn (Order#[id number], Rx#[id number])            Refill Questions - Documented Responses      Flowsheet Row Most Recent Value   Patient Availability and HIPAA Verification    Does patient want to proceed with activity? Unable to Reach                    Current Outpatient Medications   Medication Sig    belimumab (BENLYSTA) 200 mg/mL AtIn Inject 1 mL (200 mg total) into the skin every 7 days.    hydrOXYchloroQUINE (PLAQUENIL) 200 mg tablet Take 1 tablet (200 mg total) by mouth 2 (two) times daily.    ibuprofen (ADVIL,MOTRIN) 600 MG tablet Take 1 tablet (600 mg total) by mouth every 6 (six) hours as needed for Pain. (Patient not taking: Reported on 7/13/2023)    ondansetron (ZOFRAN-ODT) 4 MG TbDL Take 1 tablet (4 mg total) by mouth every 8 (eight) hours as needed (n/v).    oxyCODONE-acetaminophen (PERCOCET) 7.5-325 mg per tablet Take 1 tablet by mouth every 12 (twelve) hours as needed for Pain.    OZEMPIC 0.25 mg or 0.5 mg (2 mg/3 mL) pen injector Inject 0.5 mg into the skin every 7 days.    pantoprazole (PROTONIX) 40 MG tablet Take 1 tablet (40 mg total) by mouth once daily.    triamcinolone acetonide 0.1% (KENALOG) 0.1 % ointment Apply topically 2 (two) times daily.   Last reviewed on 7/13/2023  8:46 AM by Keely Matias MA    Review of patient's allergies indicates:   Allergen Reactions    Tramadol     Last reviewed on  7/31/2023 1:02 PM by Trav Puente      Tasks added this encounter   No tasks added.   Tasks due  within next 3 months   8/8/2023 - Initial Clinical Assessment/Patient Education (Annual Reassessment)  7/19/2023 - Set up Initial Fill     [facility]  [address]  [facility] [address]  Phone: [phone]  Fax: [phone]

## 2023-08-19 ENCOUNTER — PATIENT MESSAGE (OUTPATIENT)
Dept: RHEUMATOLOGY | Facility: CLINIC | Age: 28
End: 2023-08-19
Payer: MEDICAID

## 2023-09-14 DIAGNOSIS — R53.83 FATIGUE, UNSPECIFIED TYPE: ICD-10-CM

## 2023-09-14 DIAGNOSIS — M25.50 ARTHRALGIA, UNSPECIFIED JOINT: ICD-10-CM

## 2023-09-14 DIAGNOSIS — K21.9 GASTROESOPHAGEAL REFLUX DISEASE, UNSPECIFIED WHETHER ESOPHAGITIS PRESENT: ICD-10-CM

## 2023-09-14 DIAGNOSIS — Z79.899 HIGH RISK MEDICATION USE: ICD-10-CM

## 2023-09-14 DIAGNOSIS — G89.4 CHRONIC PAIN SYNDROME: ICD-10-CM

## 2023-09-14 DIAGNOSIS — M32.9 SYSTEMIC LUPUS ERYTHEMATOSUS, UNSPECIFIED SLE TYPE, UNSPECIFIED ORGAN INVOLVEMENT STATUS: ICD-10-CM

## 2023-09-14 DIAGNOSIS — L30.9 ECZEMA, UNSPECIFIED TYPE: ICD-10-CM

## 2023-09-14 RX ORDER — TRIAMCINOLONE ACETONIDE 1 MG/G
OINTMENT TOPICAL 2 TIMES DAILY
Qty: 80 G | Refills: 3 | Status: CANCELLED | OUTPATIENT
Start: 2023-09-14 | End: 2024-03-12

## 2023-09-14 RX ORDER — OXYCODONE AND ACETAMINOPHEN 7.5; 325 MG/1; MG/1
1 TABLET ORAL EVERY 12 HOURS PRN
Qty: 60 TABLET | Refills: 0 | Status: SHIPPED | OUTPATIENT
Start: 2023-09-14 | End: 2023-10-14

## 2023-09-14 RX ORDER — ONDANSETRON 4 MG/1
4 TABLET, ORALLY DISINTEGRATING ORAL EVERY 8 HOURS PRN
Qty: 40 TABLET | Refills: 3 | Status: CANCELLED | OUTPATIENT
Start: 2023-09-14 | End: 2024-03-12

## 2023-09-14 RX ORDER — PANTOPRAZOLE SODIUM 40 MG/1
40 TABLET, DELAYED RELEASE ORAL DAILY
Qty: 30 TABLET | Refills: 11 | Status: CANCELLED | OUTPATIENT
Start: 2023-09-14 | End: 2024-09-13

## 2023-09-14 RX ORDER — HYDROXYCHLOROQUINE SULFATE 200 MG/1
200 TABLET, FILM COATED ORAL 2 TIMES DAILY
Qty: 60 TABLET | Refills: 5 | Status: CANCELLED | OUTPATIENT
Start: 2023-09-14 | End: 2024-03-12

## 2023-09-27 ENCOUNTER — OFFICE VISIT (OUTPATIENT)
Dept: RHEUMATOLOGY | Facility: CLINIC | Age: 28
End: 2023-09-27
Payer: MEDICAID

## 2023-09-27 VITALS
SYSTOLIC BLOOD PRESSURE: 145 MMHG | BODY MASS INDEX: 33.83 KG/M2 | WEIGHT: 190.94 LBS | HEART RATE: 80 BPM | HEIGHT: 63 IN | DIASTOLIC BLOOD PRESSURE: 91 MMHG

## 2023-09-27 DIAGNOSIS — E55.9 VITAMIN D DEFICIENCY: ICD-10-CM

## 2023-09-27 DIAGNOSIS — M32.9 SYSTEMIC LUPUS ERYTHEMATOSUS, UNSPECIFIED SLE TYPE, UNSPECIFIED ORGAN INVOLVEMENT STATUS: ICD-10-CM

## 2023-09-27 DIAGNOSIS — M35.1 MCTD (MIXED CONNECTIVE TISSUE DISEASE): Primary | ICD-10-CM

## 2023-09-27 DIAGNOSIS — L65.9 ALOPECIA: ICD-10-CM

## 2023-09-27 DIAGNOSIS — Z79.899 HIGH RISK MEDICATION USE: ICD-10-CM

## 2023-09-27 DIAGNOSIS — G89.4 CHRONIC PAIN SYNDROME: ICD-10-CM

## 2023-09-27 DIAGNOSIS — M08.80 JIA (JUVENILE IDIOPATHIC ARTHRITIS): ICD-10-CM

## 2023-09-27 PROCEDURE — 1160F PR REVIEW ALL MEDS BY PRESCRIBER/CLIN PHARMACIST DOCUMENTED: ICD-10-PCS | Mod: CPTII,,, | Performed by: INTERNAL MEDICINE

## 2023-09-27 PROCEDURE — 99214 OFFICE O/P EST MOD 30 MIN: CPT | Mod: PBBFAC,PN | Performed by: INTERNAL MEDICINE

## 2023-09-27 PROCEDURE — 3080F PR MOST RECENT DIASTOLIC BLOOD PRESSURE >= 90 MM HG: ICD-10-PCS | Mod: CPTII,,, | Performed by: INTERNAL MEDICINE

## 2023-09-27 PROCEDURE — 1160F RVW MEDS BY RX/DR IN RCRD: CPT | Mod: CPTII,,, | Performed by: INTERNAL MEDICINE

## 2023-09-27 PROCEDURE — 99215 PR OFFICE/OUTPT VISIT, EST, LEVL V, 40-54 MIN: ICD-10-PCS | Mod: 25,S$PBB,, | Performed by: INTERNAL MEDICINE

## 2023-09-27 PROCEDURE — 3077F SYST BP >= 140 MM HG: CPT | Mod: CPTII,,, | Performed by: INTERNAL MEDICINE

## 2023-09-27 PROCEDURE — 3077F PR MOST RECENT SYSTOLIC BLOOD PRESSURE >= 140 MM HG: ICD-10-PCS | Mod: CPTII,,, | Performed by: INTERNAL MEDICINE

## 2023-09-27 PROCEDURE — 3008F BODY MASS INDEX DOCD: CPT | Mod: CPTII,,, | Performed by: INTERNAL MEDICINE

## 2023-09-27 PROCEDURE — 99215 OFFICE O/P EST HI 40 MIN: CPT | Mod: 25,S$PBB,, | Performed by: INTERNAL MEDICINE

## 2023-09-27 PROCEDURE — 99999 PR PBB SHADOW E&M-EST. PATIENT-LVL IV: ICD-10-PCS | Mod: PBBFAC,,, | Performed by: INTERNAL MEDICINE

## 2023-09-27 PROCEDURE — 99999 PR PBB SHADOW E&M-EST. PATIENT-LVL IV: CPT | Mod: PBBFAC,,, | Performed by: INTERNAL MEDICINE

## 2023-09-27 PROCEDURE — 1159F MED LIST DOCD IN RCRD: CPT | Mod: CPTII,,, | Performed by: INTERNAL MEDICINE

## 2023-09-27 PROCEDURE — 3008F PR BODY MASS INDEX (BMI) DOCUMENTED: ICD-10-PCS | Mod: CPTII,,, | Performed by: INTERNAL MEDICINE

## 2023-09-27 PROCEDURE — 1159F PR MEDICATION LIST DOCUMENTED IN MEDICAL RECORD: ICD-10-PCS | Mod: CPTII,,, | Performed by: INTERNAL MEDICINE

## 2023-09-27 PROCEDURE — 3080F DIAST BP >= 90 MM HG: CPT | Mod: CPTII,,, | Performed by: INTERNAL MEDICINE

## 2023-09-27 RX ORDER — OXYCODONE AND ACETAMINOPHEN 10; 325 MG/1; MG/1
1 TABLET ORAL EVERY 8 HOURS PRN
Qty: 90 TABLET | Refills: 0 | Status: SHIPPED | OUTPATIENT
Start: 2023-10-20 | End: 2023-11-19

## 2023-09-27 RX ORDER — OXYCODONE AND ACETAMINOPHEN 10; 325 MG/1; MG/1
1 TABLET ORAL EVERY 8 HOURS PRN
Qty: 90 TABLET | Refills: 0 | Status: SHIPPED | OUTPATIENT
Start: 2023-11-20 | End: 2023-12-05 | Stop reason: SDUPTHER

## 2023-09-27 RX ORDER — OXYCODONE AND ACETAMINOPHEN 7.5; 325 MG/1; MG/1
1 TABLET ORAL EVERY 8 HOURS PRN
Qty: 90 TABLET | Refills: 0 | Status: SHIPPED | OUTPATIENT
Start: 2023-09-27 | End: 2023-10-27

## 2023-09-27 RX ORDER — ERGOCALCIFEROL 1.25 MG/1
50000 CAPSULE ORAL
Qty: 4 CAPSULE | Refills: 6 | Status: SHIPPED | OUTPATIENT
Start: 2023-09-27 | End: 2023-12-05 | Stop reason: SDUPTHER

## 2023-09-27 RX ORDER — ONDANSETRON 4 MG/1
4 TABLET, FILM COATED ORAL 2 TIMES DAILY
COMMUNITY
Start: 2023-04-23 | End: 2023-11-24 | Stop reason: SDUPTHER

## 2023-09-27 RX ORDER — DICLOFENAC SODIUM AND MISOPROSTOL 75; 200 MG/1; UG/1
1 TABLET, DELAYED RELEASE ORAL 2 TIMES DAILY
Qty: 60 TABLET | Refills: 11 | Status: SHIPPED | OUTPATIENT
Start: 2023-09-27 | End: 2023-12-05 | Stop reason: SDUPTHER

## 2023-09-27 ASSESSMENT — ROUTINE ASSESSMENT OF PATIENT INDEX DATA (RAPID3)
FATIGUE SCORE: 0
PSYCHOLOGICAL DISTRESS SCORE: 0
MDHAQ FUNCTION SCORE: 0
TOTAL RAPID3 SCORE: 5.5
PATIENT GLOBAL ASSESSMENT SCORE: 7.5
PAIN SCORE: 9

## 2023-09-27 NOTE — PROGRESS NOTES
Subjective:      Patient ID: Sarahy Hameed is a 27 y.o. female.    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 Pain is located in multiple joints, both shoulder(s), both elbow(s), both wrist(s), both MCP(s): 1st, 2nd, 3rd, 4th and 5th, both PIP(s): 1st, 2nd, 3rd, 4th and 5th, both DIP(s): 1st and 2nd, both hip(s), both knee(s) and both MTP(s): 1st, 2nd, 3rd, 4th and 5th, is described as aching, pulsating, shooting and throbbing, and is constant, moderate .  Associated symptoms include: crepitation, decreased range of motion, edema, effusion, tenderness and warmth. . No mouth ulcer, plus malar rash, alopecia, joint pain, leg swelling, am gelling, plus thrush      Review of Systems   Constitutional:  Positive for chills. Negative for activity change, appetite change, diaphoresis and unexpected weight change.   HENT:  Negative for congestion, dental problem, ear discharge, ear pain, facial swelling, mouth sores, nosebleeds, postnasal drip, rhinorrhea, sinus pressure, sneezing, sore throat, tinnitus and voice change.    Eyes:  Negative for photophobia, pain, discharge, redness and itching.   Respiratory:  Positive for cough. Negative for apnea, chest tightness, shortness of breath and wheezing.    Cardiovascular:  Positive for leg swelling. Negative for chest pain and palpitations.   Gastrointestinal:  Positive for abdominal pain. Negative for abdominal distention, constipation, diarrhea, nausea and vomiting.   Endocrine: Negative for cold intolerance, heat intolerance, polydipsia and polyuria.   Genitourinary:  Negative for decreased urine volume, difficulty urinating, flank pain, frequency, hematuria and urgency.   Musculoskeletal:  Positive for arthralgias, back pain, gait problem, neck pain and neck  "stiffness.   Skin:  Negative for pallor, rash and wound.   Allergic/Immunologic: Negative for immunocompromised state.   Neurological:  Negative for dizziness, tremors and numbness.   Hematological:  Negative for adenopathy. Does not bruise/bleed easily.   Psychiatric/Behavioral:  Negative for sleep disturbance. The patient is not nervous/anxious.         Objective:   BP (!) 145/91   Pulse 80   Ht 5' 2.99" (1.6 m)   Wt 86.6 kg (190 lb 14.7 oz)   BMI 33.83 kg/m²   Physical Exam   Constitutional: She is oriented to person, place, and time.   HENT:   Head: Normocephalic and atraumatic.   Mouth/Throat: Oropharynx is clear and moist.   Eyes: Pupils are equal, round, and reactive to light.   Neck: No thyromegaly present.   Cardiovascular: Normal rate, regular rhythm and normal heart sounds. Exam reveals no gallop and no friction rub.   No murmur heard.  Pulmonary/Chest: Breath sounds normal. She has no wheezes. She has no rales. She exhibits no tenderness.   Abdominal: There is no abdominal tenderness. There is no rebound and no guarding.   Musculoskeletal:      Right shoulder: Tenderness present.      Left shoulder: Tenderness present.      Right elbow: Normal.      Left elbow: Tenderness present.      Right wrist: Tenderness present.      Left wrist: Tenderness present.      Cervical back: Neck supple.      Right knee: Effusion present. Tenderness present.      Left knee: Effusion present. Tenderness present.   Lymphadenopathy:     She has no cervical adenopathy.   Neurological: She is alert and oriented to person, place, and time. Gait normal.   Skin: Rash noted. There is erythema. There is pallor.   Psychiatric: Mood, memory, affect and judgment normal.   Vitals reviewed.      Right Side Rheumatological Exam     Examination finds the elbow normal.    The patient is tender to palpation of the shoulder, wrist, knee, 1st PIP, 1st MCP, 2nd PIP, 2nd MCP, 3rd PIP, 3rd MCP, 4th PIP, 4th MCP, 5th PIP and 5th " MCP    Shoulder Exam   Tenderness Location: acromion    Range of Motion   Active abduction:  abnormal   Adduction: abnormal  Sensation: normal    Knee Exam   Tenderness Location: medial joint line and LCL  Patellofemoral Crepitus: positive  Effusion: positive  Sensation: normal    Hip Exam   Tenderness Location: posterior and lateral  Sensation: normal    Elbow/Wrist Exam   Tenderness Location: no tenderness  Sensation: normal    Left Side Rheumatological Exam     The patient is tender to palpation of the shoulder, elbow, wrist, knee, 1st PIP, 1st MCP, 2nd PIP, 2nd MCP, 3rd PIP, 3rd MCP, 4th PIP, 4th MCP, 5th PIP and 5th MCP.    Shoulder Exam   Tenderness Location: acromion    Range of Motion   Active abduction:  abnormal   Sensation: normal    Knee Exam   Tenderness Location: lateral joint line and medial joint line    Patellofemoral Crepitus: positive  Effusion: positive  Sensation: normal    Hip Exam   Tenderness Location: posterior and lateral  Sensation: normal    Elbow/Wrist Exam   Sensation: normal      Back/Neck Exam   Tenderness Right paramedian tenderness of the Upper C-Spine, Upper T-Spine, Upper L-Spine and SI Joint.Left paramedian tenderness of the Upper C-Spine, Upper T-Spine, SI Joint and Upper L-Spine.      Results for orders placed or performed in visit on 07/13/23   Insulin, Random   Result Value Ref Range    Insulin 7.4 <25.0 uU/mL    Insulin Collection Interval random    Prealbumin   Result Value Ref Range    Prealbumin 14 (L) 20 - 43 mg/dL   Ferritin   Result Value Ref Range    Ferritin 18 (L) 20.0 - 300.0 ng/mL   Iron and TIBC   Result Value Ref Range    Iron 43 30 - 160 ug/dL    Transferrin 284 200 - 375 mg/dL    TIBC 420 250 - 450 ug/dL    Saturated Iron 10 (L) 20 - 50 %   Vitamin K   Result Value Ref Range    Vitamin K 0.16 (L) 0.22 - 4.88 nmol/L   Vitamin E   Result Value Ref Range    Vitamin E 638 500 - 1800 ug/dL   Vitamin D   Result Value Ref Range    Vit D, 25-Hydroxy 11 (L) 30 - 96  ng/mL   Zinc   Result Value Ref Range    Zinc 74 60 - 130 ug/dL   PTH, Intact   Result Value Ref Range    PTH, Intact 76.4 9.0 - 77.0 pg/mL   Magnesium   Result Value Ref Range    Magnesium 1.6 1.6 - 2.6 mg/dL   Phosphorus   Result Value Ref Range    Phosphorus 4.0 2.7 - 4.5 mg/dL   Folate   Result Value Ref Range    Folate 4.2 4.0 - 24.0 ng/mL   Vitamin B12   Result Value Ref Range    Vitamin B-12 >2000 (H) 210 - 950 pg/mL   Vitamin B1   Result Value Ref Range    Thiamine 28 (L) 38 - 122 ug/L   Complement, Total   Result Value Ref Range    Complement,Total, Serum >95 42 - 95 U/mL   C4 Complement   Result Value Ref Range    Complement (C-4) 55 (H) 11 - 44 mg/dL   C3 Complement   Result Value Ref Range    Complement (C-3) 150 50 - 180 mg/dL   Sedimentation rate   Result Value Ref Range    Sed Rate 62 (H) 0 - 36 mm/Hr   Sjogrens syndrome-B extractable nuclear antibody   Result Value Ref Range    Anti-SSB Antibody 0.07 0.00 - 0.99 Ratio    Anti-SSB Interpretation Negative Negative   Sjogrens syndrome-A extractable nuclear antibody   Result Value Ref Range    Anti-SSA Antibody 0.11 0.00 - 0.99 Ratio    Anti-SSA Interpretation Negative Negative   Comprehensive Metabolic Panel   Result Value Ref Range    Sodium 138 136 - 145 mmol/L    Potassium 3.2 (L) 3.5 - 5.1 mmol/L    Chloride 98 95 - 110 mmol/L    CO2 26 23 - 29 mmol/L    Glucose 74 70 - 110 mg/dL    BUN 7 6 - 20 mg/dL    Creatinine 0.8 0.5 - 1.4 mg/dL    Calcium 9.8 8.7 - 10.5 mg/dL    Total Protein 8.8 (H) 6.0 - 8.4 g/dL    Albumin 4.0 3.5 - 5.2 g/dL    Total Bilirubin 0.7 0.1 - 1.0 mg/dL    Alkaline Phosphatase 73 55 - 135 U/L    AST 18 10 - 40 U/L    ALT 12 10 - 44 U/L    eGFR >60.0 >60 mL/min/1.73 m^2    Anion Gap 14 8 - 16 mmol/L   CBC Auto Differential   Result Value Ref Range    WBC 5.79 3.90 - 12.70 K/uL    RBC 4.50 4.00 - 5.40 M/uL    Hemoglobin 11.4 (L) 12.0 - 16.0 g/dL    Hematocrit 38.0 37.0 - 48.5 %    MCV 84 82 - 98 fL    MCH 25.3 (L) 27.0 - 31.0 pg     MCHC 30.0 (L) 32.0 - 36.0 g/dL    RDW 14.3 11.5 - 14.5 %    Platelets 219 150 - 450 K/uL    MPV SEE COMMENT 9.2 - 12.9 fL    Immature Granulocytes 0.2 0.0 - 0.5 %    Gran # (ANC) 3.7 1.8 - 7.7 K/uL    Immature Grans (Abs) 0.01 0.00 - 0.04 K/uL    Lymph # 1.7 1.0 - 4.8 K/uL    Mono # 0.3 0.3 - 1.0 K/uL    Eos # 0.1 0.0 - 0.5 K/uL    Baso # 0.03 0.00 - 0.20 K/uL    nRBC 0 0 /100 WBC    Gran % 63.5 38.0 - 73.0 %    Lymph % 28.7 18.0 - 48.0 %    Mono % 5.2 4.0 - 15.0 %    Eosinophil % 1.9 0.0 - 8.0 %    Basophil % 0.5 0.0 - 1.9 %    Differential Method Automated    Anti-Smith Antibody   Result Value Ref Range    Anti Sm Antibody 0.16 0.00 - 0.99 Ratio    Anti-Sm Interpretation Negative Negative   Anti-Scleroderma Antibody   Result Value Ref Range    Scleroderma SCL- <0.6 <7.0 U/mL   Anti-Histone Antibody   Result Value Ref Range    Anti-Histone Antibody 0.4 0.0 - 0.9 Units   Anti-DNA Ab, Double-Stranded   Result Value Ref Range    ds DNA Ab Negative 1:10 Negative 1:10   Anti Sm/RNP Antibody   Result Value Ref Range    Anti Sm/RNP Antibody 0.10 0.00 - 0.99 Ratio    Anti-Sm/RNP Interpretation Negative Negative   WINSOME Screen w/Reflex   Result Value Ref Range    WINSOME Screen Positive (A) Negative <1:80   Hepatitis C Antibody   Result Value Ref Range    Hepatitis C Ab Non-reactive Non-reactive   Quantiferon Gold TB   Result Value Ref Range    NIL 0.09641 IU/mL    TB1 - Nil -0.002 IU/mL    TB2 - Nil 0.019 IU/mL    Mitogen - Nil 9.969 IU/mL    TB Gold Plus Negative Negative   Hepatitis B Surface Antigen   Result Value Ref Range    Hepatitis B Surface Ag Non-reactive Non-reactive   Hepatitis B Surface Antibody, Qual/Quant   Result Value Ref Range    Hep. B Surf Ab, Qual Negative     Hep. B Surf Ab, Quant. <3 mIU/mL   Hepatitis B Core Antibody, Total   Result Value Ref Range    Hep B Core Total Ab Non-reactive Non-reactive   Hepatitis B Surface Ab, Qualitative   Result Value Ref Range    Hep B S Ab <3.00 mIU/mL    Hep B S Ab  Non-reactive    Cyclic Citrullinated Peptide Antibody, IgG   Result Value Ref Range    CCP Antibodies 0.7 <5.0 U/mL   WINSOEM Pattern 1   Result Value Ref Range    WINSOME PATTERN 1 Nucleolar    WINSOME Titer 1   Result Value Ref Range    WINSOME Titer 1 >=1:2560       Received Information       Assessment:     1. MCTD (mixed connective tissue disease)    2. Systemic lupus erythematosus, unspecified SLE type, unspecified organ involvement status    3. High risk medication use    4. Chronic pain syndrome    5. KEMAR (juvenile idiopathic arthritis)    6. Vitamin D deficiency    7. Alopecia            Plan:   Sarahy was seen today for disease management.    Diagnoses and all orders for this visit:    MCTD (mixed connective tissue disease)  -     oxyCODONE-acetaminophen (PERCOCET) 7.5-325 mg per tablet; Take 1 tablet by mouth every 8 (eight) hours as needed for Pain.  -     oxyCODONE-acetaminophen (PERCOCET)  mg per tablet; Take 1 tablet by mouth every 8 (eight) hours as needed for Pain.  -     oxyCODONE-acetaminophen (PERCOCET)  mg per tablet; Take 1 tablet by mouth every 8 (eight) hours as needed for Pain.  -     ergocalciferol (ERGOCALCIFEROL) 50,000 unit Cap; Take 1 capsule (50,000 Units total) by mouth every 7 days.  -     Sedimentation rate; Future  -     C-Reactive Protein; Future  -     Comprehensive Metabolic Panel; Future  -     CBC Auto Differential; Future  -     diclofenac-misoprostol  mg-mcg (ARTHROTEC 75)  mg-mcg per tablet; Take 1 tablet by mouth 2 (two) times daily.    Systemic lupus erythematosus, unspecified SLE type, unspecified organ involvement status  -     oxyCODONE-acetaminophen (PERCOCET) 7.5-325 mg per tablet; Take 1 tablet by mouth every 8 (eight) hours as needed for Pain.  -     oxyCODONE-acetaminophen (PERCOCET)  mg per tablet; Take 1 tablet by mouth every 8 (eight) hours as needed for Pain.  -     oxyCODONE-acetaminophen (PERCOCET)  mg per tablet; Take 1 tablet by mouth  every 8 (eight) hours as needed for Pain.  -     ergocalciferol (ERGOCALCIFEROL) 50,000 unit Cap; Take 1 capsule (50,000 Units total) by mouth every 7 days.  -     Sedimentation rate; Future  -     C-Reactive Protein; Future  -     Comprehensive Metabolic Panel; Future  -     CBC Auto Differential; Future  -     diclofenac-misoprostol  mg-mcg (ARTHROTEC 75)  mg-mcg per tablet; Take 1 tablet by mouth 2 (two) times daily.    High risk medication use  -     oxyCODONE-acetaminophen (PERCOCET) 7.5-325 mg per tablet; Take 1 tablet by mouth every 8 (eight) hours as needed for Pain.  -     oxyCODONE-acetaminophen (PERCOCET)  mg per tablet; Take 1 tablet by mouth every 8 (eight) hours as needed for Pain.  -     oxyCODONE-acetaminophen (PERCOCET)  mg per tablet; Take 1 tablet by mouth every 8 (eight) hours as needed for Pain.  -     ergocalciferol (ERGOCALCIFEROL) 50,000 unit Cap; Take 1 capsule (50,000 Units total) by mouth every 7 days.  -     Sedimentation rate; Future  -     C-Reactive Protein; Future  -     Comprehensive Metabolic Panel; Future  -     CBC Auto Differential; Future  -     diclofenac-misoprostol  mg-mcg (ARTHROTEC 75)  mg-mcg per tablet; Take 1 tablet by mouth 2 (two) times daily.    Chronic pain syndrome  -     oxyCODONE-acetaminophen (PERCOCET) 7.5-325 mg per tablet; Take 1 tablet by mouth every 8 (eight) hours as needed for Pain.  -     oxyCODONE-acetaminophen (PERCOCET)  mg per tablet; Take 1 tablet by mouth every 8 (eight) hours as needed for Pain.  -     oxyCODONE-acetaminophen (PERCOCET)  mg per tablet; Take 1 tablet by mouth every 8 (eight) hours as needed for Pain.  -     ergocalciferol (ERGOCALCIFEROL) 50,000 unit Cap; Take 1 capsule (50,000 Units total) by mouth every 7 days.  -     Sedimentation rate; Future  -     C-Reactive Protein; Future  -     Comprehensive Metabolic Panel; Future  -     CBC Auto Differential; Future  -      diclofenac-misoprostol  mg-mcg (ARTHROTEC 75)  mg-mcg per tablet; Take 1 tablet by mouth 2 (two) times daily.    KEMAR (juvenile idiopathic arthritis)  -     oxyCODONE-acetaminophen (PERCOCET) 7.5-325 mg per tablet; Take 1 tablet by mouth every 8 (eight) hours as needed for Pain.  -     oxyCODONE-acetaminophen (PERCOCET)  mg per tablet; Take 1 tablet by mouth every 8 (eight) hours as needed for Pain.  -     oxyCODONE-acetaminophen (PERCOCET)  mg per tablet; Take 1 tablet by mouth every 8 (eight) hours as needed for Pain.  -     ergocalciferol (ERGOCALCIFEROL) 50,000 unit Cap; Take 1 capsule (50,000 Units total) by mouth every 7 days.  -     Sedimentation rate; Future  -     C-Reactive Protein; Future  -     Comprehensive Metabolic Panel; Future  -     CBC Auto Differential; Future  -     diclofenac-misoprostol  mg-mcg (ARTHROTEC 75)  mg-mcg per tablet; Take 1 tablet by mouth 2 (two) times daily.    Vitamin D deficiency  -     ergocalciferol (ERGOCALCIFEROL) 50,000 unit Cap; Take 1 capsule (50,000 Units total) by mouth every 7 days.  -     Sedimentation rate; Future  -     C-Reactive Protein; Future  -     Comprehensive Metabolic Panel; Future  -     CBC Auto Differential; Future    Alopecia  -     Ambulatory referral/consult to Dermatology; Future         Continue plaquenil  Start benlysta is ineffective meaning decrease sed rate and hair growth we will try oluminant ( xeljanz)  Add protonix, zofran   Ordered perocet pt is allergic to tramadol and norco caused n/v . I have  check [place] prescription monitoring program site and no unusual or abnormal behavior has occured  Pain aggreement signed  F/u 3 to  4 month  More than 50% of the 60 minute encounter was spent face to face counseling the patient regarding current status and future plan of care as well as side of the medications. All questions were answered to patient's satisfaction

## 2023-09-28 ENCOUNTER — TELEPHONE (OUTPATIENT)
Dept: RHEUMATOLOGY | Facility: CLINIC | Age: 28
End: 2023-09-28
Payer: MEDICAID

## 2023-09-28 NOTE — TELEPHONE ENCOUNTER
----- Message from Marsha Shine sent at 9/27/2023  3:51 PM CDT -----  Contact: self  Type: RX Refill Request        Who Called: patient   Refill or New Rx: refill  RX Name and Strength: oxyCODONE-acetaminophen (PERCOCET) 7.5-325 mg per tablet  How is the patient currently taking it? (ex. 1XDay): as directed   Is this a 30 day or 90 day RX: n/a  Preferred Pharmacy with phone number:   [id number] - [facility] [address] AT [facility] [address] [facility] [address] [place]  [address] [facility] [address]  Phone: [phone] Fax: [phone]  Local or Mail Order: local   Ordering Provider: Dr. Duncan Schofield Call Back Number: [phone]  Additional Information: Pt states [facility] is telling her they didn't get the percocet medication but they received the other 2 medications that was sent in. Thanks

## 2023-09-28 NOTE — TELEPHONE ENCOUNTER
Spoke to pt and she has her prescription she was just making sure they had the October one, but she spoke to them again and they have it. I spoke to the pharmacist at [facility] and the pt picked up a 30 day supply of percocet on 9/19/23.

## 2023-10-02 ENCOUNTER — PATIENT MESSAGE (OUTPATIENT)
Dept: RHEUMATOLOGY | Facility: CLINIC | Age: 28
End: 2023-10-02
Payer: MEDICAID

## 2023-11-24 DIAGNOSIS — M32.9 SYSTEMIC LUPUS ERYTHEMATOSUS, UNSPECIFIED SLE TYPE, UNSPECIFIED ORGAN INVOLVEMENT STATUS: ICD-10-CM

## 2023-11-24 DIAGNOSIS — K21.9 GASTROESOPHAGEAL REFLUX DISEASE, UNSPECIFIED WHETHER ESOPHAGITIS PRESENT: ICD-10-CM

## 2023-11-24 DIAGNOSIS — L30.9 ECZEMA, UNSPECIFIED TYPE: ICD-10-CM

## 2023-11-24 DIAGNOSIS — M25.50 ARTHRALGIA, UNSPECIFIED JOINT: ICD-10-CM

## 2023-11-24 DIAGNOSIS — G89.4 CHRONIC PAIN SYNDROME: ICD-10-CM

## 2023-11-24 DIAGNOSIS — R53.83 FATIGUE, UNSPECIFIED TYPE: ICD-10-CM

## 2023-11-24 DIAGNOSIS — Z79.899 HIGH RISK MEDICATION USE: ICD-10-CM

## 2023-11-24 RX ORDER — ONDANSETRON 4 MG/1
4 TABLET, FILM COATED ORAL 2 TIMES DAILY
Qty: 45 TABLET | Refills: 3 | Status: SHIPPED | OUTPATIENT
Start: 2023-11-24 | End: 2023-12-05 | Stop reason: SDUPTHER

## 2023-11-24 RX ORDER — TRIAMCINOLONE ACETONIDE 1 MG/G
OINTMENT TOPICAL 2 TIMES DAILY
Qty: 80 G | Refills: 3 | Status: SHIPPED | OUTPATIENT
Start: 2023-11-24 | End: 2023-12-05 | Stop reason: SDUPTHER

## 2023-11-24 NOTE — TELEPHONE ENCOUNTER
----- Message from Getachew Yee sent at 11/24/2023  9:56 AM CST -----  Type:  RX Refill Request    Who Called:  pt  Refill or New Rx:  refill  RX Name and Strength:  oxyCODONE-acetaminophen (PERCOCET)  mg per tablet  ondansetron (ZOFRAN-ODT) 4 MG TbDL  triamcinolone acetonide 0.1% (KENALOG) 0.1 % ointment  How is the patient currently taking it? (ex. 1XDay):  as directed  Is this a 30 day or 90 day RX:  30  Preferred Pharmacy with phone number:    [facility] #[id number] - [facility] [address] AT [facility] [address] [facility] [address] [place] [address] [facility] [address]  Phone: [phone] Fax: [phone]      Local or Mail Order:  local  Ordering Provider:  Elijah Schofield Call Back Number:  [phone] (home)     Additional Information:  please call and advise--thank you--said she need nurse Melinda to please give her a call

## 2023-12-05 DIAGNOSIS — L30.9 ECZEMA, UNSPECIFIED TYPE: ICD-10-CM

## 2023-12-05 DIAGNOSIS — R53.83 FATIGUE, UNSPECIFIED TYPE: ICD-10-CM

## 2023-12-05 DIAGNOSIS — M25.50 ARTHRALGIA, UNSPECIFIED JOINT: ICD-10-CM

## 2023-12-05 DIAGNOSIS — G89.4 CHRONIC PAIN SYNDROME: ICD-10-CM

## 2023-12-05 DIAGNOSIS — M08.80 JIA (JUVENILE IDIOPATHIC ARTHRITIS): ICD-10-CM

## 2023-12-05 DIAGNOSIS — M35.1 MCTD (MIXED CONNECTIVE TISSUE DISEASE): ICD-10-CM

## 2023-12-05 DIAGNOSIS — Z79.899 HIGH RISK MEDICATION USE: ICD-10-CM

## 2023-12-05 DIAGNOSIS — M32.9 SYSTEMIC LUPUS ERYTHEMATOSUS, UNSPECIFIED SLE TYPE, UNSPECIFIED ORGAN INVOLVEMENT STATUS: ICD-10-CM

## 2023-12-05 DIAGNOSIS — E55.9 VITAMIN D DEFICIENCY: ICD-10-CM

## 2023-12-05 DIAGNOSIS — K21.9 GASTROESOPHAGEAL REFLUX DISEASE, UNSPECIFIED WHETHER ESOPHAGITIS PRESENT: ICD-10-CM

## 2023-12-06 RX ORDER — OXYCODONE AND ACETAMINOPHEN 10; 325 MG/1; MG/1
1 TABLET ORAL EVERY 8 HOURS PRN
Qty: 90 TABLET | Refills: 0 | Status: SHIPPED | OUTPATIENT
Start: 2023-12-06 | End: 2023-12-18 | Stop reason: RX

## 2023-12-06 RX ORDER — ONDANSETRON 4 MG/1
4 TABLET, ORALLY DISINTEGRATING ORAL EVERY 8 HOURS PRN
Qty: 40 TABLET | Refills: 3 | Status: SHIPPED | OUTPATIENT
Start: 2023-12-06 | End: 2024-06-03

## 2023-12-06 RX ORDER — TRIAMCINOLONE ACETONIDE 1 MG/G
OINTMENT TOPICAL 2 TIMES DAILY
Qty: 80 G | Refills: 3 | Status: SHIPPED | OUTPATIENT
Start: 2023-12-06 | End: 2024-06-03

## 2023-12-06 RX ORDER — DICLOFENAC SODIUM AND MISOPROSTOL 75; 200 MG/1; UG/1
1 TABLET, DELAYED RELEASE ORAL 2 TIMES DAILY
Qty: 60 TABLET | Refills: 11 | Status: SHIPPED | OUTPATIENT
Start: 2023-12-06 | End: 2024-12-05

## 2023-12-06 RX ORDER — HYDROXYCHLOROQUINE SULFATE 200 MG/1
200 TABLET, FILM COATED ORAL 2 TIMES DAILY
Qty: 60 TABLET | Refills: 5 | Status: SHIPPED | OUTPATIENT
Start: 2023-12-06 | End: 2024-06-03

## 2023-12-06 RX ORDER — ERGOCALCIFEROL 1.25 MG/1
50000 CAPSULE ORAL
Qty: 4 CAPSULE | Refills: 6 | Status: SHIPPED | OUTPATIENT
Start: 2023-12-06

## 2023-12-06 RX ORDER — PANTOPRAZOLE SODIUM 40 MG/1
40 TABLET, DELAYED RELEASE ORAL DAILY
Qty: 30 TABLET | Refills: 11 | Status: SHIPPED | OUTPATIENT
Start: 2023-12-06 | End: 2024-01-02 | Stop reason: SDUPTHER

## 2023-12-06 RX ORDER — ONDANSETRON 4 MG/1
4 TABLET, FILM COATED ORAL 2 TIMES DAILY
Qty: 45 TABLET | Refills: 3 | Status: SHIPPED | OUTPATIENT
Start: 2023-12-06 | End: 2024-02-10

## 2023-12-18 ENCOUNTER — PATIENT MESSAGE (OUTPATIENT)
Dept: RHEUMATOLOGY | Facility: CLINIC | Age: 28
End: 2023-12-18
Payer: MEDICAID

## 2023-12-18 DIAGNOSIS — G89.4 CHRONIC PAIN SYNDROME: Primary | ICD-10-CM

## 2023-12-19 RX ORDER — OXYCODONE AND ACETAMINOPHEN 10; 325 MG/1; MG/1
1 TABLET ORAL EVERY 8 HOURS PRN
Qty: 90 TABLET | Refills: 0 | Status: SHIPPED | OUTPATIENT
Start: 2023-12-19 | End: 2024-01-11 | Stop reason: SDUPTHER

## 2024-01-02 ENCOUNTER — OFFICE VISIT (OUTPATIENT)
Dept: RHEUMATOLOGY | Facility: CLINIC | Age: 29
End: 2024-01-02
Payer: MEDICAID

## 2024-01-02 VITALS
BODY MASS INDEX: 32.05 KG/M2 | HEIGHT: 62 IN | SYSTOLIC BLOOD PRESSURE: 167 MMHG | WEIGHT: 174.19 LBS | HEART RATE: 75 BPM | DIASTOLIC BLOOD PRESSURE: 97 MMHG

## 2024-01-02 DIAGNOSIS — Z79.899 HIGH RISK MEDICATION USE: ICD-10-CM

## 2024-01-02 DIAGNOSIS — G89.4 CHRONIC PAIN SYNDROME: ICD-10-CM

## 2024-01-02 DIAGNOSIS — L30.9 ECZEMA, UNSPECIFIED TYPE: ICD-10-CM

## 2024-01-02 DIAGNOSIS — K21.9 GASTROESOPHAGEAL REFLUX DISEASE, UNSPECIFIED WHETHER ESOPHAGITIS PRESENT: ICD-10-CM

## 2024-01-02 DIAGNOSIS — M32.9 SYSTEMIC LUPUS ERYTHEMATOSUS, UNSPECIFIED SLE TYPE, UNSPECIFIED ORGAN INVOLVEMENT STATUS: ICD-10-CM

## 2024-01-02 PROCEDURE — 3008F BODY MASS INDEX DOCD: CPT | Mod: CPTII,,, | Performed by: PHYSICIAN ASSISTANT

## 2024-01-02 PROCEDURE — 1160F RVW MEDS BY RX/DR IN RCRD: CPT | Mod: CPTII,,, | Performed by: PHYSICIAN ASSISTANT

## 2024-01-02 PROCEDURE — 99999 PR PBB SHADOW E&M-EST. PATIENT-LVL IV: CPT | Mod: PBBFAC,,, | Performed by: PHYSICIAN ASSISTANT

## 2024-01-02 PROCEDURE — 99214 OFFICE O/P EST MOD 30 MIN: CPT | Mod: PBBFAC,PN | Performed by: PHYSICIAN ASSISTANT

## 2024-01-02 PROCEDURE — 99214 OFFICE O/P EST MOD 30 MIN: CPT | Mod: S$PBB,,, | Performed by: PHYSICIAN ASSISTANT

## 2024-01-02 PROCEDURE — 3077F SYST BP >= 140 MM HG: CPT | Mod: CPTII,,, | Performed by: PHYSICIAN ASSISTANT

## 2024-01-02 PROCEDURE — 1159F MED LIST DOCD IN RCRD: CPT | Mod: CPTII,,, | Performed by: PHYSICIAN ASSISTANT

## 2024-01-02 PROCEDURE — 3080F DIAST BP >= 90 MM HG: CPT | Mod: CPTII,,, | Performed by: PHYSICIAN ASSISTANT

## 2024-01-02 RX ORDER — PANTOPRAZOLE SODIUM 40 MG/1
40 TABLET, DELAYED RELEASE ORAL DAILY
Qty: 30 TABLET | Refills: 11 | Status: SHIPPED | OUTPATIENT
Start: 2024-01-02 | End: 2024-01-23

## 2024-01-02 ASSESSMENT — ROUTINE ASSESSMENT OF PATIENT INDEX DATA (RAPID3)
PATIENT GLOBAL ASSESSMENT SCORE: 5
PSYCHOLOGICAL DISTRESS SCORE: 0
FATIGUE SCORE: 0
MDHAQ FUNCTION SCORE: 0
PAIN SCORE: 7.5
TOTAL RAPID3 SCORE: 4.17

## 2024-01-02 NOTE — Clinical Note
Hi,  Can we reactive her account with [facility] to set up delivery of Benlysta? Thank you so much!  Ramila

## 2024-01-02 NOTE — PATIENT INSTRUCTIONS
Trav, PharmD    [facility]  [address]  [facility] [address]  Phone: [phone]

## 2024-01-02 NOTE — Clinical Note
New Benlysta start. Approved several months ago, but never set up delivery or start in treatment. Please continue to follow and set up for education.

## 2024-01-02 NOTE — PROGRESS NOTES
Subjective:     Patient ID:  Sarahy Hameed    Chief Complaint:  Disease Management     History of Present Illness:  Pt is a 28 y.o. female with systemic lupus erythematous who presents today for f/u. Last clinic visit was 9/27/23. At that time, she has not started Benlysta due to difficulty with setting up delivery of the med. She has continued plaquenil 200 mg bid. She complains of uncontrolled joint pain in in her wrists, elbows, knees, and low back. Pain is constant and aching in nature.       Rheumatologic History:   - Diagnosis/es:  - Positive serologies:  - Negative serologies:  - Infectious screening labs:  - Imaging:  - Previous Treatments:  - Current Treatments:     Interval History:   Hospitalization since last office visit: {YES **/NO:23350}    There are no problems to display for this patient.    Past Surgical History:   Procedure Laterality Date    arm surgery Left 1998     Social History     Tobacco Use    Smoking status: Never    Smokeless tobacco: Never   Substance Use Topics    Alcohol use: No    Drug use: No     No family history on file.  Review of patient's allergies indicates:   Allergen Reactions    Tramadol        Vaccines:  Recommend influenza annually, prevnar 20 if not completed yet, TdaP every 10 years, shingrix x 2, COVID, and RSV if 60 years old or older  Patient is due for {crrheumvax:52211}  Patient declined the following vaccines: influenza, COVID***    Influenza: Discuss and recommend annually.   PCV 20: Discuss and recommend 1 dose  Based on share clinical decision making. Either way Pneumonia vaccines are complete. Due now if pt would like to receive  Tetanus (TdaP): Discuss and recommend booster every 10 years. Due now  Shingrix: Discuss and recommend. 2 dose series 2-6 months apart. Due now  Covid: CDC recommends 1 new 5710-2310 dose in immunocompromised pts that has received 2 or more COVID doses. 3 doses are recommended in non-vaccinated immunocompromised patients  RSV: Discuss  "and recommend 1 dose if 60 years old or older    Review of Systems   Review of Systems     Current Medications:  Current Outpatient Medications   Medication Instructions    BENLYSTA 200 mg, Subcutaneous, Every 7 days    diclofenac-misoprostol  mg-mcg (ARTHROTEC 75)  mg-mcg per tablet 1 tablet, Oral, 2 times daily    ergocalciferol (ERGOCALCIFEROL) 50,000 unit Cap Take 1 capsule by mouth every 7 days.    hydroxychloroquine (PLAQUENIL) 200 mg, Oral, 2 times daily    ondansetron (ZOFRAN) 4 mg, Oral, 2 times daily    ondansetron (ZOFRAN-ODT) 4 mg, Oral, Every 8 hours PRN    oxyCODONE-acetaminophen (PERCOCET)  mg per tablet 1 tablet, Oral, Every 8 hours PRN    OZEMPIC 0.5 mg, Subcutaneous, Every 7 days    pantoprazole (PROTONIX) 40 mg, Oral, Daily    triamcinolone acetonide 0.1% (KENALOG) 0.1 % ointment Apply topically to the affected area 2 (two) times daily.         Objective:     Vitals:    01/02/24 1415   BP: (!) 167/97   Pulse: 75   Weight: 79 kg (174 lb 2.6 oz)   Height: 5' 2.4" (1.585 m)   PainSc:   7      Body mass index is 31.45 kg/m².     Physical Examinations:  Physical Exam     Disease Assessment Scores:  Patient's Global Assessment of arthritis (0-10): {NUMBERS; 0-10:5044}  Physician's Global Assessment of arthritis (0-10): {NUMBERS; 0-10:5044}  Number of Tender Joints (0-28): {Numbers; 0-28:71351}  Number of Swollen Joints (0-28): {Numbers; 0-28:61359}    There is currently no information documented on the homunculus. Go to the Rheumatology activity and complete the homunculus joint exam.          No data to display                Monitoring Lab Results:  Lab Results   Component Value Date    WBC 5.79 07/13/2023    RBC 4.50 07/13/2023    HGB 11.4 (L) 07/13/2023    HCT 38.0 07/13/2023    MCV 84 07/13/2023    MCH 25.3 (L) 07/13/2023    MCHC 30.0 (L) 07/13/2023    RDW 14.3 07/13/2023     07/13/2023        Lab Results   Component Value Date     01/01/2024    K 3.7 01/01/2024    CL " "98 07/13/2023    CO2 25 01/01/2024    GLU 74 07/13/2023    BUN 10 01/01/2024    CREATININE 0.61 01/01/2024    CALCIUM 8.8 (L) 01/01/2024    PROT 8.0 (H) 07/16/2023    ALBUMIN 4.1 07/16/2023    BILITOT 0.6 07/16/2023    ALKPHOS 73 07/16/2023    AST 14 07/16/2023    ALT 12 07/16/2023    ANIONGAP 9 01/01/2024    EGFRNORACEVR >60.0 07/13/2023       Lab Results   Component Value Date    SEDRATE 62 (H) 07/13/2023        Lab Results   Component Value Date    GSQNIWCS83IP 11 (L) 07/13/2023    OCONIGUK35 >2000 (H) 07/13/2023        Lab Results   Component Value Date    CHOL 138 01/23/2023    HDL 45 01/23/2023    LDLCALC 74 01/23/2023    TRIG 100 01/23/2023       Lab Results   Component Value Date    CCPANTIBODIE 0.7 07/13/2023     Lab Results   Component Value Date    ANASCREEN Positive (A) 07/13/2023    ANATITER >=1:2560 07/13/2023    DSDNA Negative 1:10 07/13/2023     No results found for: "HLABB27"    Infectious Disease Screening:  Lab Results   Component Value Date    HEPBSAG Non-reactive 07/13/2023    HEPBCAB Non-reactive 07/13/2023    HEPBSAB <3.00 07/13/2023    HEPBSAB Non-reactive 07/13/2023    HEPBSURFABQU Negative 07/13/2023    HEPBSURFABQU <3 07/13/2023     Lab Results   Component Value Date    HEPCAB Non-reactive 07/13/2023     Lab Results   Component Value Date    TBGOLDPLUS Negative 07/13/2023     No results found for: "QUANTIFERON", "SVCMT", "QUANTAGVALUE", "QUANTNILVALU", "QUANTMITOGEN", "QFTTBAG", "QINT"     Imaging: *** DEXA, Xrays, MRIs, CTs, etc    Old & Outside Medical Records:  Reviewed old and all outside medical records available in Care Everywhere***    Current Medication Changes:  Medication List with Changes/Refills   Current Medications    BELIMUMAB (BENLYSTA) 200 MG/ML ATIN    Inject 1 mL (200 mg total) into the skin every 7 days.    DICLOFENAC-MISOPROSTOL  MG-MCG (ARTHROTEC 75)  MG-MCG PER TABLET    Take 1 tablet by mouth 2 (two) times daily.    ERGOCALCIFEROL (ERGOCALCIFEROL) 50,000 " UNIT CAP    Take 1 capsule by mouth every 7 days.    HYDROXYCHLOROQUINE (PLAQUENIL) 200 MG TABLET    Take 1 tablet (200 mg total) by mouth 2 (two) times daily.    ONDANSETRON (ZOFRAN) 4 MG TABLET    Take 1 tablet (4 mg total) by mouth 2 (two) times daily.    ONDANSETRON (ZOFRAN-ODT) 4 MG TBDL    Take 1 tablet (4 mg total) by mouth every 8 (eight) hours as needed (n/v).    OXYCODONE-ACETAMINOPHEN (PERCOCET)  MG PER TABLET    Take 1 tablet by mouth every 8 (eight) hours as needed for Pain.    OZEMPIC 0.25 MG OR 0.5 MG (2 MG/3 ML) PEN INJECTOR    Inject 0.5 mg into the skin every 7 days.    PANTOPRAZOLE (PROTONIX) 40 MG TABLET    Take 1 tablet (40 mg total) by mouth once daily.    TRIAMCINOLONE ACETONIDE 0.1% (KENALOG) 0.1 % OINTMENT    Apply topically to the affected area 2 (two) times daily.        Assessment:     Pt is a 28 y.o. female with {rheum dx:80547}. The patient {HAS HAS NOT:52187} achieved clinical remission. Plan is to {DESC START/DISCONTINUE:91375} {crrheuspecialtylist:59924} and {DESC START/DISCONTINUE:79466} {crrheuspecialtylist:42127}. ***     Plan:      There are no diagnoses linked to this encounter.     Follow-up {Time; 1 week to 1 year:68098}    Provider's Name***  Rheumatology Dept.  [facility] - [place], [place]

## 2024-01-11 DIAGNOSIS — K21.9 GASTROESOPHAGEAL REFLUX DISEASE, UNSPECIFIED WHETHER ESOPHAGITIS PRESENT: ICD-10-CM

## 2024-01-11 DIAGNOSIS — Z79.899 HIGH RISK MEDICATION USE: ICD-10-CM

## 2024-01-11 DIAGNOSIS — G89.4 CHRONIC PAIN SYNDROME: ICD-10-CM

## 2024-01-11 DIAGNOSIS — R53.83 FATIGUE, UNSPECIFIED TYPE: ICD-10-CM

## 2024-01-11 DIAGNOSIS — L30.9 ECZEMA, UNSPECIFIED TYPE: ICD-10-CM

## 2024-01-11 DIAGNOSIS — M32.9 SYSTEMIC LUPUS ERYTHEMATOSUS, UNSPECIFIED SLE TYPE, UNSPECIFIED ORGAN INVOLVEMENT STATUS: ICD-10-CM

## 2024-01-11 DIAGNOSIS — M25.50 ARTHRALGIA, UNSPECIFIED JOINT: ICD-10-CM

## 2024-01-11 RX ORDER — OXYCODONE AND ACETAMINOPHEN 10; 325 MG/1; MG/1
1 TABLET ORAL EVERY 8 HOURS PRN
Qty: 90 TABLET | Refills: 0 | Status: CANCELLED | OUTPATIENT
Start: 2024-01-11

## 2024-01-11 RX ORDER — ONDANSETRON 4 MG/1
4 TABLET, ORALLY DISINTEGRATING ORAL EVERY 8 HOURS PRN
Qty: 40 TABLET | Refills: 3 | Status: CANCELLED | OUTPATIENT
Start: 2024-01-11 | End: 2024-07-09

## 2024-01-13 RX ORDER — OXYCODONE AND ACETAMINOPHEN 10; 325 MG/1; MG/1
1 TABLET ORAL EVERY 8 HOURS PRN
Qty: 90 TABLET | Refills: 0 | Status: SHIPPED | OUTPATIENT
Start: 2024-01-13 | End: 2024-01-15

## 2024-01-15 DIAGNOSIS — G89.4 CHRONIC PAIN SYNDROME: ICD-10-CM

## 2024-01-15 RX ORDER — OXYCODONE AND ACETAMINOPHEN 10; 325 MG/1; MG/1
1 TABLET ORAL EVERY 8 HOURS PRN
Qty: 90 TABLET | Refills: 0 | Status: SHIPPED | OUTPATIENT
Start: 2024-02-14

## 2024-01-15 RX ORDER — OXYCODONE AND ACETAMINOPHEN 10; 325 MG/1; MG/1
1 TABLET ORAL EVERY 8 HOURS PRN
Qty: 90 TABLET | Refills: 0 | Status: SHIPPED | OUTPATIENT
Start: 2024-03-15

## 2024-01-15 RX ORDER — OXYCODONE AND ACETAMINOPHEN 10; 325 MG/1; MG/1
1 TABLET ORAL EVERY 8 HOURS PRN
Qty: 90 TABLET | Refills: 0 | Status: SHIPPED | OUTPATIENT
Start: 2024-01-16

## 2024-01-15 NOTE — PROGRESS NOTES
Subjective:       Patient ID: Sarahy Hameed is a 28 y.o. female.    Chief Complaint: Disease Management    Mrs. Hameed is a 28 year old female who presents to clinic for follow up on SLE. She is a new patient to me. Last clinic visit was 9/27/23. At that time, she has not started Benlysta due to difficulty with setting up delivery of the med. She has continued plaquenil 200 mg bid. She complains of uncontrolled joint pain in in her wrists, elbows, knees, and low back. Pain is constant and aching in nature. She states pain is typically worse in the morning and improves some with activity then worsens. She complains of intermittent rash on the face and eczema on her elbows and behind her knees. She denies oral/nasal ulcers, chest pain, shortness of breath, or raynauds. She complains of dry eyes and dry mouth.     She is on antibiotics for vaginal infection.     She is taking percocet prn for severe pain without side effects.       Review of Systems   Constitutional:  Positive for activity change. Negative for appetite change, chills, fatigue and fever.   Eyes:  Negative for visual disturbance.   Respiratory:  Negative for cough and shortness of breath.    Cardiovascular:  Negative for chest pain, palpitations and leg swelling.   Gastrointestinal:  Negative for abdominal pain, constipation, diarrhea, nausea and vomiting.   Musculoskeletal:  Positive for arthralgias and joint swelling.   Neurological:  Negative for dizziness, weakness, light-headedness and headaches.         Objective:     Vitals:    01/02/24 1415   BP: (!) 167/97   Pulse: 75       History reviewed. No pertinent past medical history.  Past Surgical History:   Procedure Laterality Date    arm surgery Left 1998          Physical Exam   Constitutional: She is oriented to person, place, and time.   Eyes: Right conjunctiva is not injected. Left conjunctiva is not injected.   Neck: No JVD present. No thyromegaly present.   Cardiovascular: Normal rate.    Pulmonary/Chest: Effort normal.   Musculoskeletal:      Right elbow: Tenderness present.      Left elbow: Tenderness present.      Right wrist: Tenderness present.      Left wrist: Tenderness present.      Right knee: Tenderness present.      Left knee: Tenderness present.   Lymphadenopathy:     She has no cervical adenopathy.   Neurological: She is alert and oriented to person, place, and time. Gait normal.   Skin: No rash noted.   Psychiatric: Mood and affect normal.       Right Side Rheumatological Exam     The patient is tender to palpation of the elbow, wrist, knee, 1st PIP, 1st MCP, 2nd PIP, 2nd MCP, 3rd PIP, 3rd MCP, 4th PIP, 4th MCP, 5th PIP and 5th MCP    Left Side Rheumatological Exam     The patient is tender to palpation of the elbow, wrist, knee, 1st PIP, 1st MCP, 2nd PIP, 2nd MCP, 3rd PIP, 3rd MCP, 4th PIP, 4th MCP, 5th PIP and 5th MCP.            Component      Latest Ref Rng 1/1/2024   Glucose      65 - 99 mg/dL 82 (E)   Sodium      136 - 144 mmol/L 142 (E)   Potassium      3.6 - 5.1 mmol/L 3.7 (E)   Chloride      101 - 111 mmol/L 108 (E)   CO2      22 - 32 mmol/L 25 (E)   BUN      8 - 20 mg/dL 10 (E)   Calcium      8.9 - 10.3 mg/dL 8.8 (L) (E)   Creatinine      0.60 - 1.10 mg/dL 0.61 (E)   Anion Gap      7 - 16 mmol/L 9 (E)     Assessment:       1. Systemic lupus erythematosus, unspecified SLE type, unspecified organ involvement status    2. Gastroesophageal reflux disease, unspecified whether esophagitis present    3. Eczema, unspecified type    4. High risk medication use    5. Chronic pain syndrome            Plan:       Systemic lupus erythematosus, unspecified SLE type, unspecified organ involvement status  -     CBC Auto Differential; Future; Expected date: 01/02/2024  -     Comprehensive Metabolic Panel; Future; Expected date: 01/02/2024  -     C-Reactive Protein; Future; Expected date: 01/02/2024  -     Sedimentation rate; Future; Expected date: 01/02/2024  -     Anti-DNA Ab,  Double-Stranded; Future; Expected date: 01/02/2024  -     C3 Complement; Future; Expected date: 01/02/2024  -     C4 Complement; Future; Expected date: 01/02/2024    Gastroesophageal reflux disease, unspecified whether esophagitis present  -     pantoprazole (PROTONIX) 40 MG tablet; Take 1 tablet (40 mg total) by mouth once daily.  Dispense: 30 tablet; Refill: 11    Eczema, unspecified type    High risk medication use    Chronic pain syndrome        Assessment:  28 year old female with  SLE, WINSOME 1:2560 nucleolar, elevated sed rate, anemia  --chronic pain syndrome    Plan:  Check labs  Follow up on Benlysta approval  Cont plaquenil 200 mg bid  Cont protonix 40 mg daily  Cont percocet.  I have checked [place] prescription monitoring program site and no unusual or abnormal behavior has occurred pt understand the risk and benefits of taking opioid medications and has decided to continue the medication.      Follow up:  3-4 mo Dr. Song

## 2024-01-16 ENCOUNTER — TELEPHONE (OUTPATIENT)
Dept: RHEUMATOLOGY | Facility: CLINIC | Age: 29
End: 2024-01-16
Payer: MEDICAID

## 2024-01-16 PROBLEM — M32.9 SYSTEMIC LUPUS ERYTHEMATOSUS: Status: ACTIVE | Noted: 2024-01-16

## 2024-01-16 RX ORDER — BELIMUMAB 200 MG/ML
200 SOLUTION SUBCUTANEOUS
Qty: 4 ML | Refills: 11 | Status: ACTIVE | OUTPATIENT
Start: 2024-01-16 | End: 2025-01-15

## 2024-01-16 NOTE — TELEPHONE ENCOUNTER
"Sent teams message to Trav COLEMAN at [facility] that patients' case needs to be reopened and to please keep me in the loop as patient will need education and injection training/guidance. Copied below as well.     "Ashlee Ravi! MRN: [id number] saw ramila recently and mentioned that she was having difficulty arrainging delievery of her benlysta. Ramila gave her [facility] number and your namne. Can you reopen her case and get me in the loop? Ramila wants her to see me for education and training"    Will follow  "

## 2024-01-16 NOTE — TELEPHONE ENCOUNTER
----- Message from Ramila Roberson PA-C sent at 1/15/2024  2:06 PM CST -----  New Benlysta start. Approved several months ago, but never set up delivery or start in treatment. Please continue to follow and set up for education.

## 2024-03-05 ENCOUNTER — PATIENT MESSAGE (OUTPATIENT)
Dept: RHEUMATOLOGY | Facility: CLINIC | Age: 29
End: 2024-03-05
Payer: MEDICAID

## 2024-03-05 ENCOUNTER — DOCUMENTATION ONLY (OUTPATIENT)
Dept: PHARMACY | Facility: CLINIC | Age: 29
End: 2024-03-05
Payer: MEDICAID

## 2024-03-05 NOTE — PROGRESS NOTES
Prior authorization requested by providers office for Oxycodone-Acetaminophen 10-325mg Through 7/3/2024      Paula Rice CPhT  M.A.S.

## 2024-04-15 ENCOUNTER — OFFICE VISIT (OUTPATIENT)
Dept: RHEUMATOLOGY | Facility: CLINIC | Age: 29
End: 2024-04-15
Payer: MEDICAID

## 2024-04-15 VITALS
SYSTOLIC BLOOD PRESSURE: 158 MMHG | WEIGHT: 158.5 LBS | HEART RATE: 76 BPM | DIASTOLIC BLOOD PRESSURE: 94 MMHG | BODY MASS INDEX: 29.17 KG/M2 | HEIGHT: 62 IN

## 2024-04-15 DIAGNOSIS — T78.40XS ALLERGY, SEQUELA: ICD-10-CM

## 2024-04-15 DIAGNOSIS — Z82.3 FAMILY HISTORY OF CEREBROVASCULAR ACCIDENT (CVA): ICD-10-CM

## 2024-04-15 DIAGNOSIS — R53.83 FATIGUE, UNSPECIFIED TYPE: ICD-10-CM

## 2024-04-15 DIAGNOSIS — Z79.899 HIGH RISK MEDICATION USE: ICD-10-CM

## 2024-04-15 DIAGNOSIS — L30.9 ECZEMA, UNSPECIFIED TYPE: ICD-10-CM

## 2024-04-15 DIAGNOSIS — G47.00 INSOMNIA, UNSPECIFIED TYPE: ICD-10-CM

## 2024-04-15 DIAGNOSIS — G89.4 CHRONIC PAIN SYNDROME: ICD-10-CM

## 2024-04-15 DIAGNOSIS — M35.1 MCTD (MIXED CONNECTIVE TISSUE DISEASE): ICD-10-CM

## 2024-04-15 DIAGNOSIS — M08.80 JIA (JUVENILE IDIOPATHIC ARTHRITIS): ICD-10-CM

## 2024-04-15 DIAGNOSIS — M25.50 ARTHRALGIA, UNSPECIFIED JOINT: ICD-10-CM

## 2024-04-15 DIAGNOSIS — D64.9 ANEMIA, UNSPECIFIED TYPE: ICD-10-CM

## 2024-04-15 DIAGNOSIS — G89.29 CHRONIC PAIN OF BOTH KNEES: ICD-10-CM

## 2024-04-15 DIAGNOSIS — M32.9 SYSTEMIC LUPUS ERYTHEMATOSUS, UNSPECIFIED SLE TYPE, UNSPECIFIED ORGAN INVOLVEMENT STATUS: ICD-10-CM

## 2024-04-15 DIAGNOSIS — K21.9 GASTROESOPHAGEAL REFLUX DISEASE, UNSPECIFIED WHETHER ESOPHAGITIS PRESENT: ICD-10-CM

## 2024-04-15 DIAGNOSIS — G45.9 TIA (TRANSIENT ISCHEMIC ATTACK): ICD-10-CM

## 2024-04-15 DIAGNOSIS — L65.9 ALOPECIA: ICD-10-CM

## 2024-04-15 DIAGNOSIS — M25.562 CHRONIC PAIN OF BOTH KNEES: ICD-10-CM

## 2024-04-15 DIAGNOSIS — E55.9 VITAMIN D DEFICIENCY: ICD-10-CM

## 2024-04-15 DIAGNOSIS — M32.9 SYSTEMIC LUPUS ERYTHEMATOSUS, UNSPECIFIED SLE TYPE, UNSPECIFIED ORGAN INVOLVEMENT STATUS: Primary | ICD-10-CM

## 2024-04-15 DIAGNOSIS — M25.561 CHRONIC PAIN OF BOTH KNEES: ICD-10-CM

## 2024-04-15 PROCEDURE — 3077F SYST BP >= 140 MM HG: CPT | Mod: CPTII,,, | Performed by: INTERNAL MEDICINE

## 2024-04-15 PROCEDURE — 99213 OFFICE O/P EST LOW 20 MIN: CPT | Mod: PBBFAC,PN | Performed by: INTERNAL MEDICINE

## 2024-04-15 PROCEDURE — 1159F MED LIST DOCD IN RCRD: CPT | Mod: CPTII,,, | Performed by: INTERNAL MEDICINE

## 2024-04-15 PROCEDURE — 99999 PR PBB SHADOW E&M-EST. PATIENT-LVL III: CPT | Mod: PBBFAC,,, | Performed by: INTERNAL MEDICINE

## 2024-04-15 PROCEDURE — 99215 OFFICE O/P EST HI 40 MIN: CPT | Mod: S$PBB,,, | Performed by: INTERNAL MEDICINE

## 2024-04-15 PROCEDURE — 3008F BODY MASS INDEX DOCD: CPT | Mod: CPTII,,, | Performed by: INTERNAL MEDICINE

## 2024-04-15 PROCEDURE — 3080F DIAST BP >= 90 MM HG: CPT | Mod: CPTII,,, | Performed by: INTERNAL MEDICINE

## 2024-04-15 RX ORDER — IBUPROFEN AND FAMOTIDINE 26.6; 8 MG/1; MG/1
1 TABLET ORAL 3 TIMES DAILY
Qty: 90 TABLET | Refills: 12 | Status: SHIPPED | OUTPATIENT
Start: 2024-04-15 | End: 2024-05-22

## 2024-04-15 RX ORDER — FINASTERIDE 1 MG/1
1 TABLET, FILM COATED ORAL DAILY
Qty: 30 TABLET | Refills: 11 | Status: SHIPPED | OUTPATIENT
Start: 2024-04-15 | End: 2024-05-22 | Stop reason: SDUPTHER

## 2024-04-15 RX ORDER — OXYCODONE AND ACETAMINOPHEN 10; 325 MG/1; MG/1
1 TABLET ORAL EVERY 8 HOURS PRN
Qty: 90 TABLET | Refills: 0 | Status: SHIPPED | OUTPATIENT
Start: 2024-05-13 | End: 2024-05-22 | Stop reason: SDUPTHER

## 2024-04-15 RX ORDER — LEVOCETIRIZINE DIHYDROCHLORIDE 5 MG/1
5 TABLET, FILM COATED ORAL NIGHTLY
Qty: 30 TABLET | Refills: 11 | Status: SHIPPED | OUTPATIENT
Start: 2024-04-15 | End: 2025-04-15

## 2024-04-15 RX ORDER — HYDROXYCHLOROQUINE SULFATE 200 MG/1
200 TABLET, FILM COATED ORAL 2 TIMES DAILY
Qty: 60 TABLET | Refills: 5 | Status: SHIPPED | OUTPATIENT
Start: 2024-04-15 | End: 2024-10-12

## 2024-04-15 RX ORDER — DOXEPIN HYDROCHLORIDE 10 MG/1
20 CAPSULE ORAL NIGHTLY
Qty: 60 CAPSULE | Refills: 11 | Status: SHIPPED | OUTPATIENT
Start: 2024-04-15 | End: 2025-04-15

## 2024-04-15 RX ORDER — MINOXIDIL 2.5 MG/1
2.5 TABLET ORAL DAILY
Qty: 30 TABLET | Refills: 11 | Status: SHIPPED | OUTPATIENT
Start: 2024-04-15 | End: 2025-04-15

## 2024-04-15 RX ORDER — ERGOCALCIFEROL 1.25 MG/1
50000 CAPSULE ORAL
Qty: 4 CAPSULE | Refills: 6 | Status: SHIPPED | OUTPATIENT
Start: 2024-04-15

## 2024-04-15 RX ORDER — MONTELUKAST SODIUM 10 MG/1
10 TABLET ORAL NIGHTLY
Qty: 30 TABLET | Refills: 5 | Status: SHIPPED | OUTPATIENT
Start: 2024-04-15 | End: 2024-10-12

## 2024-04-15 RX ORDER — ONDANSETRON 4 MG/1
4 TABLET, ORALLY DISINTEGRATING ORAL EVERY 8 HOURS PRN
Qty: 40 TABLET | Refills: 3 | Status: SHIPPED | OUTPATIENT
Start: 2024-04-15 | End: 2024-10-12

## 2024-04-15 RX ORDER — NALOXONE HYDROCHLORIDE 4 MG/.1ML
1 SPRAY NASAL ONCE
Qty: 1 EACH | Refills: 0 | Status: SHIPPED | OUTPATIENT
Start: 2024-04-15 | End: 2024-04-15

## 2024-04-15 RX ORDER — MELOXICAM 15 MG/1
15 TABLET ORAL DAILY
Qty: 30 TABLET | Refills: 5 | Status: SHIPPED | OUTPATIENT
Start: 2024-04-15 | End: 2024-04-15

## 2024-04-15 RX ORDER — CHOLECALCIFEROL (VITAMIN D3) 125 MCG
5000 CAPSULE ORAL
COMMUNITY
Start: 2024-03-15

## 2024-04-15 RX ORDER — OXYCODONE AND ACETAMINOPHEN 10; 325 MG/1; MG/1
1 TABLET ORAL EVERY 8 HOURS PRN
Qty: 90 TABLET | Refills: 0 | Status: SHIPPED | OUTPATIENT
Start: 2024-04-15 | End: 2024-05-15

## 2024-04-15 RX ORDER — OXYCODONE AND ACETAMINOPHEN 10; 325 MG/1; MG/1
1 TABLET ORAL EVERY 8 HOURS PRN
Qty: 90 TABLET | Refills: 0 | Status: SHIPPED | OUTPATIENT
Start: 2024-06-11 | End: 2024-07-11

## 2024-04-15 RX ORDER — ONDANSETRON 4 MG/1
4 TABLET, FILM COATED ORAL 2 TIMES DAILY
COMMUNITY
Start: 2024-03-15

## 2024-04-15 ASSESSMENT — ROUTINE ASSESSMENT OF PATIENT INDEX DATA (RAPID3)
MDHAQ FUNCTION SCORE: 0
PAIN SCORE: 7.5
PSYCHOLOGICAL DISTRESS SCORE: 1.1
PATIENT GLOBAL ASSESSMENT SCORE: 1
FATIGUE SCORE: 2.2
TOTAL RAPID3 SCORE: 2.83

## 2024-04-15 NOTE — PROGRESS NOTES
Subjective:     Patient ID:  Sarahy Hameed    Chief Complaint:  Disease Management     History of Present Illness:  Follow up: 28 year old female who presents to clinic for follow up on SLE.  At that time, she has not started Benlysta due to difficulty with setting up delivery of the med. She has continued plaquenil 200 mg bid. She complains of uncontrolled joint pain in in her wrists, elbows, knees, and low back. Pain is constant and aching in nature. She states pain is typically worse in the morning and improves some with activity then worsens. She complains of intermittent rash on the face and eczema on her elbows and behind her knees. She denies oral/nasal ulcers, chest pain, shortness of breath, or raynauds. She complains of dry eyes and dry mouth.      She is on antibiotics for vaginal infection.      She is taking percocet prn for severe pain without side effects.     Rheumatologic History:   - Diagnosis/es:  - Positive serologies:  - Infectious screening labs:  - Previous Treatments:  - Current Treatments:     Interval History:   Hospitalization since last office visit: No    Patient Active Problem List    Diagnosis Date Noted    Systemic lupus erythematosus 01/16/2024     Past Surgical History:   Procedure Laterality Date    arm surgery Left 1998     Social History     Tobacco Use    Smoking status: Never    Smokeless tobacco: Never   Substance Use Topics    Alcohol use: No    Drug use: No     No family history on file.  Review of patient's allergies indicates:   Allergen Reactions    Tramadol        Review of Systems   Review of Systems   Constitutional:  Positive for chills and fatigue. Negative for activity change, appetite change, diaphoresis, fever and unexpected weight change.   HENT:  Negative for congestion, dental problem, ear discharge, ear pain, facial swelling, mouth sores, nosebleeds, postnasal drip, rhinorrhea, sinus pressure, sneezing, sore throat, tinnitus, trouble swallowing and voice  change.    Eyes:  Negative for photophobia, pain, discharge, redness and itching.   Respiratory:  Positive for cough. Negative for apnea, chest tightness, shortness of breath and wheezing.    Cardiovascular:  Positive for leg swelling. Negative for chest pain and palpitations.   Gastrointestinal:  Positive for abdominal pain. Negative for abdominal distention, constipation, diarrhea, nausea and vomiting.   Endocrine: Negative for cold intolerance, heat intolerance, polydipsia and polyuria.   Genitourinary:  Negative for decreased urine volume, difficulty urinating, dysuria, flank pain, frequency, hematuria and urgency.   Musculoskeletal:  Positive for arthralgias, back pain, gait problem, joint swelling, myalgias, neck pain and neck stiffness.   Skin:  Negative for pallor, rash and wound.   Allergic/Immunologic: Negative for immunocompromised state.   Neurological:  Negative for dizziness, tremors, numbness and headaches.   Hematological:  Negative for adenopathy. Does not bruise/bleed easily.   Psychiatric/Behavioral:  Negative for sleep disturbance. The patient is not nervous/anxious.         Current Medications:  Current Outpatient Medications   Medication Instructions    BENLYSTA 200 mg, Subcutaneous, Every 7 days    cholecalciferol (vitamin D3) 5,000 Units, Oral, Every 7 days    diclofenac-misoprostol  mg-mcg (ARTHROTEC 75)  mg-mcg per tablet 1 tablet, Oral, 2 times daily    doxepin (SINEQUAN) 20 mg, Oral, Nightly    ergocalciferol (ERGOCALCIFEROL) 50,000 unit Cap Take 1 capsule by mouth every 7 days.    finasteride (PROPECIA) 1 mg, Oral, Daily    hydroxychloroquine (PLAQUENIL) 200 mg, Oral, 2 times daily    ibuprofen-famotidine (DUEXIS) 800-26.6 mg Tab 1 tablet, Oral, 3 times daily    levocetirizine (XYZAL) 5 mg, Oral, Nightly    minoxidiL (LONITEN) 2.5 mg, Oral, Daily    montelukast (SINGULAIR) 10 mg, Oral, Nightly    naloxone (NARCAN) 4 mg, Nasal, Once    ondansetron (ZOFRAN) 4 mg, Oral, 2 times  daily    ondansetron (ZOFRAN-ODT) 4 mg, Oral, Every 8 hours PRN    oxyCODONE-acetaminophen (PERCOCET)  mg per tablet 1 tablet, Oral, Every 8 hours PRN    oxyCODONE-acetaminophen (PERCOCET)  mg per tablet 1 tablet, Oral, Every 8 hours PRN    [START ON 5/13/2024] oxyCODONE-acetaminophen (PERCOCET)  mg per tablet 1 tablet, Oral, Every 8 hours PRN    [START ON 6/11/2024] oxyCODONE-acetaminophen (PERCOCET)  mg per tablet 1 tablet, Oral, Every 8 hours PRN    triamcinolone acetonide 0.1% (KENALOG) 0.1 % ointment Apply topically to the affected area 2 (two) times daily.     Current Outpatient Medications   Medication Sig Dispense Refill    belimumab (BENLYSTA) 200 mg/mL AtIn Inject 1 mL (200 mg total) into the skin every 7 days. 4 mL 11    cholecalciferol, vitamin D3, 125 mcg (5,000 unit) capsule Take 5,000 Units by mouth every 7 days.      diclofenac-misoprostol  mg-mcg (ARTHROTEC 75)  mg-mcg per tablet Take 1 tablet by mouth 2 (two) times daily. 60 tablet 11    ondansetron (ZOFRAN) 4 MG tablet Take 4 mg by mouth 2 (two) times daily.      oxyCODONE-acetaminophen (PERCOCET)  mg per tablet Take 1 tablet by mouth every 8 (eight) hours as needed for Pain. 90 tablet 0    triamcinolone acetonide 0.1% (KENALOG) 0.1 % ointment Apply topically to the affected area 2 (two) times daily. 80 g 3    doxepin (SINEQUAN) 10 MG capsule Take 2 capsules (20 mg total) by mouth every evening. 60 capsule 11    ergocalciferol (ERGOCALCIFEROL) 50,000 unit Cap Take 1 capsule by mouth every 7 days. 4 capsule 6    finasteride (PROPECIA) 1 mg tablet Take 1 tablet (1 mg total) by mouth once daily. 30 tablet 11    hydroxychloroquine (PLAQUENIL) 200 mg tablet Take 1 tablet (200 mg total) by mouth 2 (two) times daily. 60 tablet 5    ibuprofen-famotidine (DUEXIS) 800-26.6 mg Tab Take 1 tablet by mouth 3 (three) times daily. 90 tablet 12    levocetirizine (XYZAL) 5 MG tablet Take 1 tablet (5 mg total) by mouth every  "evening. 30 tablet 11    minoxidiL (LONITEN) 2.5 MG tablet Take 1 tablet (2.5 mg total) by mouth once daily. 30 tablet 11    montelukast (SINGULAIR) 10 mg tablet Take 1 tablet (10 mg total) by mouth every evening. 30 tablet 5    naloxone (NARCAN) 4 mg/actuation Spry 1 spray (4 mg total) by Nasal route once. for 1 dose 1 each 0    ondansetron (ZOFRAN-ODT) 4 MG TbDL Take 1 tablet (4 mg total) by mouth every 8 (eight) hours as needed (n/v). 40 tablet 3    oxyCODONE-acetaminophen (PERCOCET)  mg per tablet Take 1 tablet by mouth every 8 (eight) hours as needed for Pain. 90 tablet 0    [START ON 5/13/2024] oxyCODONE-acetaminophen (PERCOCET)  mg per tablet Take 1 tablet by mouth every 8 (eight) hours as needed for Pain. 90 tablet 0    [START ON 6/11/2024] oxyCODONE-acetaminophen (PERCOCET)  mg per tablet Take 1 tablet by mouth every 8 (eight) hours as needed for Pain. 90 tablet 0     No current facility-administered medications for this visit.       Objective:     Vitals:    04/15/24 1356   BP: (!) 158/94   Pulse: 76   Weight: 71.9 kg (158 lb 8.2 oz)   Height: 5' 2.4" (1.585 m)   PainSc:   6      Body mass index is 28.62 kg/m².     Physical Examinations:  Physical Exam     Disease Assessment Scores:  Patient's Global Assessment of arthritis (0-10): 3  Physician's Global Assessment of arthritis (0-10): 3  Number of Tender Joints (0-28): 5  Number of Swollen Joints (0-28): 6    There is currently no information documented on the homunculus. Go to the Rheumatology activity and complete the homunculus joint exam.          No data to display                Monitoring Lab Results:  Lab Results   Component Value Date    WBC 5.79 07/13/2023    RBC 4.50 07/13/2023    HGB 11.4 (L) 07/13/2023    HCT 38.0 07/13/2023    MCV 84 07/13/2023    MCH 25.3 (L) 07/13/2023    MCHC 30.0 (L) 07/13/2023    RDW 14.3 07/13/2023     07/13/2023        Lab Results   Component Value Date     01/01/2024    K 3.7 01/01/2024 " "   CL 98 07/13/2023    CO2 25 01/01/2024    GLU 74 07/13/2023    BUN 10 01/01/2024    CREATININE 0.61 01/01/2024    CALCIUM 8.8 (L) 01/01/2024    PROT 8.0 (H) 07/16/2023    ALBUMIN 4.1 07/16/2023    BILITOT 0.6 07/16/2023    ALKPHOS 73 07/16/2023    AST 14 07/16/2023    ALT 12 07/16/2023    ANIONGAP 9 01/01/2024    EGFRNORACEVR >60.0 07/13/2023       Lab Results   Component Value Date    SEDRATE 62 (H) 07/13/2023        Lab Results   Component Value Date    VPSTZXHP08TT 11 (L) 07/13/2023    LSXBSMYE46 >2000 (H) 07/13/2023        Lab Results   Component Value Date    CHOL 138 01/23/2023    HDL 45 01/23/2023    LDLCALC 74 01/23/2023    TRIG 100 01/23/2023       Lab Results   Component Value Date    CCPANTIBODIE 0.7 07/13/2023     Lab Results   Component Value Date    ANASCREEN Positive (A) 07/13/2023    ANATITER >=1:2560 07/13/2023    DSDNA Negative 1:10 07/13/2023     No results found for: "HLABB27"    Infectious Disease Screening:  Lab Results   Component Value Date    HEPBSAG Non-reactive 07/13/2023    HEPBCAB Non-reactive 07/13/2023    HEPBSAB <3.00 07/13/2023    HEPBSAB Non-reactive 07/13/2023    HEPBSURFABQU Negative 07/13/2023    HEPBSURFABQU <3 07/13/2023     Lab Results   Component Value Date    HEPCAB Non-reactive 07/13/2023     Lab Results   Component Value Date    TBGOLDPLUS Negative 07/13/2023     No results found for: "QUANTIFERON", "SVCMT", "QUANTAGVALUE", "QUANTNILVALU", "QUANTMITOGEN", "QFTTBAG", "QINT"     Imaging: DEXA, Xrays, MRIs, CTs, etc    Old & Outside Medical Records:  Reviewed old and all outside medical records available in Care Everywhere    Current Medication Changes:  Medication List with Changes/Refills   New Medications    DOXEPIN (SINEQUAN) 10 MG CAPSULE    Take 2 capsules (20 mg total) by mouth every evening.    FINASTERIDE (PROPECIA) 1 MG TABLET    Take 1 tablet (1 mg total) by mouth once daily.    IBUPROFEN-FAMOTIDINE (DUEXIS) 800-26.6 MG TAB    Take 1 tablet by mouth 3 (three) " times daily.    LEVOCETIRIZINE (XYZAL) 5 MG TABLET    Take 1 tablet (5 mg total) by mouth every evening.    MINOXIDIL (LONITEN) 2.5 MG TABLET    Take 1 tablet (2.5 mg total) by mouth once daily.    MONTELUKAST (SINGULAIR) 10 MG TABLET    Take 1 tablet (10 mg total) by mouth every evening.    NALOXONE (NARCAN) 4 MG/ACTUATION SPRY    1 spray (4 mg total) by Nasal route once. for 1 dose    OXYCODONE-ACETAMINOPHEN (PERCOCET)  MG PER TABLET    Take 1 tablet by mouth every 8 (eight) hours as needed for Pain.    OXYCODONE-ACETAMINOPHEN (PERCOCET)  MG PER TABLET    Take 1 tablet by mouth every 8 (eight) hours as needed for Pain.    OXYCODONE-ACETAMINOPHEN (PERCOCET)  MG PER TABLET    Take 1 tablet by mouth every 8 (eight) hours as needed for Pain.   Current Medications    BELIMUMAB (BENLYSTA) 200 MG/ML ATIN    Inject 1 mL (200 mg total) into the skin every 7 days.    CHOLECALCIFEROL, VITAMIN D3, 125 MCG (5,000 UNIT) CAPSULE    Take 5,000 Units by mouth every 7 days.    DICLOFENAC-MISOPROSTOL  MG-MCG (ARTHROTEC 75)  MG-MCG PER TABLET    Take 1 tablet by mouth 2 (two) times daily.    ONDANSETRON (ZOFRAN) 4 MG TABLET    Take 4 mg by mouth 2 (two) times daily.    OXYCODONE-ACETAMINOPHEN (PERCOCET)  MG PER TABLET    Take 1 tablet by mouth every 8 (eight) hours as needed for Pain.    TRIAMCINOLONE ACETONIDE 0.1% (KENALOG) 0.1 % OINTMENT    Apply topically to the affected area 2 (two) times daily.   Changed and/or Refilled Medications    Modified Medication Previous Medication    ERGOCALCIFEROL (ERGOCALCIFEROL) 50,000 UNIT CAP ergocalciferol (ERGOCALCIFEROL) 50,000 unit Cap       Take 1 capsule by mouth every 7 days.    Take 1 capsule by mouth every 7 days.    HYDROXYCHLOROQUINE (PLAQUENIL) 200 MG TABLET hydroxychloroquine (PLAQUENIL) 200 mg tablet       Take 1 tablet (200 mg total) by mouth 2 (two) times daily.    Take 1 tablet (200 mg total) by mouth 2 (two) times daily.    ONDANSETRON  (ZOFRAN-ODT) 4 MG TBDL ondansetron (ZOFRAN-ODT) 4 MG TbDL       Take 1 tablet (4 mg total) by mouth every 8 (eight) hours as needed (n/v).    Take 1 tablet (4 mg total) by mouth every 8 (eight) hours as needed (n/v).   Discontinued Medications    OXYCODONE-ACETAMINOPHEN (PERCOCET)  MG PER TABLET    Take 1 tablet by mouth every 8 (eight) hours as needed for Pain.    OXYCODONE-ACETAMINOPHEN (PERCOCET)  MG PER TABLET    Take 1 tablet by mouth every 8 (eight) hours as needed for Pain.        Assessment:     Encounter Diagnoses   Name Primary?    Systemic lupus erythematosus, unspecified SLE type, unspecified organ involvement status Yes    Chronic pain syndrome     KEMAR (juvenile idiopathic arthritis)     Eczema, unspecified type     TIA (transient ischemic attack)     Family history of cerebrovascular accident (CVA)     Anemia, unspecified type     High risk medication use     MCTD (mixed connective tissue disease)     Vitamin D deficiency     Arthralgia, unspecified joint     Fatigue, unspecified type     Gastroesophageal reflux disease, unspecified whether esophagitis present     Chronic pain of both knees     Allergy, sequela     Alopecia     Insomnia, unspecified type        Plan:      Encounter Diagnoses   Name Primary?    Systemic lupus erythematosus, unspecified SLE type, unspecified organ involvement status Yes    Chronic pain syndrome     KEMAR (juvenile idiopathic arthritis)     Eczema, unspecified type     TIA (transient ischemic attack)     Family history of cerebrovascular accident (CVA)     Anemia, unspecified type     High risk medication use     MCTD (mixed connective tissue disease)     Vitamin D deficiency     Arthralgia, unspecified joint     Fatigue, unspecified type     Gastroesophageal reflux disease, unspecified whether esophagitis present     Chronic pain of both knees     Allergy, sequela     Alopecia     Insomnia, unspecified type      Sarahy was seen today for disease  management.    Diagnoses and all orders for this visit:    Systemic lupus erythematosus, unspecified SLE type, unspecified organ involvement status  -     Cardiolipin antibody; Future  -     Beta-2 Glycoprotein Abs (IgA, IgG, IgM); Future  -     Antithrombin III; Future  -     Protein S Activity; Future  -     Protein C Activity; Future  -     Factor 5 leiden; Future  -     DRVVT; Future  -     Homocysteine, Serum; Future  -     Sedimentation rate; Future  -     Sjogrens syndrome-B extractable nuclear antibody; Future  -     Sjogrens syndrome-A extractable nuclear antibody; Future  -     Comprehensive Metabolic Panel; Future  -     CBC Auto Differential; Future  -     Anti-Smith Antibody; Future  -     Anti-Scleroderma Antibody; Future  -     Anti-Histone Antibody; Future  -     Anti-DNA Ab, Double-Stranded; Future  -     Anti Sm/RNP Antibody; Future  -     WINSOME Screen w/Reflex; Future  -     oxyCODONE-acetaminophen (PERCOCET)  mg per tablet; Take 1 tablet by mouth every 8 (eight) hours as needed for Pain.  -     oxyCODONE-acetaminophen (PERCOCET)  mg per tablet; Take 1 tablet by mouth every 8 (eight) hours as needed for Pain.  -     oxyCODONE-acetaminophen (PERCOCET)  mg per tablet; Take 1 tablet by mouth every 8 (eight) hours as needed for Pain.  -     ergocalciferol (ERGOCALCIFEROL) 50,000 unit Cap; Take 1 capsule by mouth every 7 days.  -     hydroxychloroquine (PLAQUENIL) 200 mg tablet; Take 1 tablet (200 mg total) by mouth 2 (two) times daily.  -     ondansetron (ZOFRAN-ODT) 4 MG TbDL; Take 1 tablet (4 mg total) by mouth every 8 (eight) hours as needed (n/v).  -     Discontinue: meloxicam (MOBIC) 15 MG tablet; Take 1 tablet (15 mg total) by mouth once daily.  -     ibuprofen-famotidine (DUEXIS) 800-26.6 mg Tab; Take 1 tablet by mouth 3 (three) times daily.    Chronic pain syndrome  -     Cardiolipin antibody; Future  -     Beta-2 Glycoprotein Abs (IgA, IgG, IgM); Future  -     Antithrombin  III; Future  -     Protein S Activity; Future  -     Protein C Activity; Future  -     Factor 5 leiden; Future  -     DRVVT; Future  -     Homocysteine, Serum; Future  -     Sedimentation rate; Future  -     Sjogrens syndrome-B extractable nuclear antibody; Future  -     Sjogrens syndrome-A extractable nuclear antibody; Future  -     Comprehensive Metabolic Panel; Future  -     CBC Auto Differential; Future  -     Anti-Smith Antibody; Future  -     Anti-Scleroderma Antibody; Future  -     Anti-Histone Antibody; Future  -     Anti-DNA Ab, Double-Stranded; Future  -     Anti Sm/RNP Antibody; Future  -     WINSOME Screen w/Reflex; Future  -     oxyCODONE-acetaminophen (PERCOCET)  mg per tablet; Take 1 tablet by mouth every 8 (eight) hours as needed for Pain.  -     oxyCODONE-acetaminophen (PERCOCET)  mg per tablet; Take 1 tablet by mouth every 8 (eight) hours as needed for Pain.  -     oxyCODONE-acetaminophen (PERCOCET)  mg per tablet; Take 1 tablet by mouth every 8 (eight) hours as needed for Pain.  -     ergocalciferol (ERGOCALCIFEROL) 50,000 unit Cap; Take 1 capsule by mouth every 7 days.  -     hydroxychloroquine (PLAQUENIL) 200 mg tablet; Take 1 tablet (200 mg total) by mouth 2 (two) times daily.  -     ondansetron (ZOFRAN-ODT) 4 MG TbDL; Take 1 tablet (4 mg total) by mouth every 8 (eight) hours as needed (n/v).  -     naloxone (NARCAN) 4 mg/actuation Spry; 1 spray (4 mg total) by Nasal route once. for 1 dose  -     Discontinue: meloxicam (MOBIC) 15 MG tablet; Take 1 tablet (15 mg total) by mouth once daily.    KEMAR (juvenile idiopathic arthritis)  -     oxyCODONE-acetaminophen (PERCOCET)  mg per tablet; Take 1 tablet by mouth every 8 (eight) hours as needed for Pain.  -     oxyCODONE-acetaminophen (PERCOCET)  mg per tablet; Take 1 tablet by mouth every 8 (eight) hours as needed for Pain.  -     oxyCODONE-acetaminophen (PERCOCET)  mg per tablet; Take 1 tablet by mouth every 8 (eight)  hours as needed for Pain.  -     ergocalciferol (ERGOCALCIFEROL) 50,000 unit Cap; Take 1 capsule by mouth every 7 days.  -     ibuprofen-famotidine (DUEXIS) 800-26.6 mg Tab; Take 1 tablet by mouth 3 (three) times daily.    Eczema, unspecified type  -     Cardiolipin antibody; Future  -     Beta-2 Glycoprotein Abs (IgA, IgG, IgM); Future  -     Antithrombin III; Future  -     Protein S Activity; Future  -     Protein C Activity; Future  -     Factor 5 leiden; Future  -     DRVVT; Future  -     Homocysteine, Serum; Future  -     Sedimentation rate; Future  -     Sjogrens syndrome-B extractable nuclear antibody; Future  -     Sjogrens syndrome-A extractable nuclear antibody; Future  -     Comprehensive Metabolic Panel; Future  -     CBC Auto Differential; Future  -     Anti-Smith Antibody; Future  -     Anti-Scleroderma Antibody; Future  -     Anti-Histone Antibody; Future  -     Anti-DNA Ab, Double-Stranded; Future  -     Anti Sm/RNP Antibody; Future  -     WINSOME Screen w/Reflex; Future  -     hydroxychloroquine (PLAQUENIL) 200 mg tablet; Take 1 tablet (200 mg total) by mouth 2 (two) times daily.  -     ondansetron (ZOFRAN-ODT) 4 MG TbDL; Take 1 tablet (4 mg total) by mouth every 8 (eight) hours as needed (n/v).    TIA (transient ischemic attack)    Family history of cerebrovascular accident (CVA)  -     Cardiolipin antibody; Future  -     Beta-2 Glycoprotein Abs (IgA, IgG, IgM); Future  -     Antithrombin III; Future  -     Protein S Activity; Future  -     Protein C Activity; Future  -     Factor 5 leiden; Future  -     DRVVT; Future  -     Homocysteine, Serum; Future  -     Sedimentation rate; Future  -     Sjogrens syndrome-B extractable nuclear antibody; Future  -     Sjogrens syndrome-A extractable nuclear antibody; Future  -     Comprehensive Metabolic Panel; Future  -     CBC Auto Differential; Future  -     Anti-Smith Antibody; Future  -     Anti-Scleroderma Antibody; Future  -     Anti-Histone Antibody;  Future  -     Anti-DNA Ab, Double-Stranded; Future  -     Anti Sm/RNP Antibody; Future  -     WINSOME Screen w/Reflex; Future    Anemia, unspecified type  -     SICKLE CELL SCREEN; Future    High risk medication use  -     ergocalciferol (ERGOCALCIFEROL) 50,000 unit Cap; Take 1 capsule by mouth every 7 days.  -     hydroxychloroquine (PLAQUENIL) 200 mg tablet; Take 1 tablet (200 mg total) by mouth 2 (two) times daily.  -     ondansetron (ZOFRAN-ODT) 4 MG TbDL; Take 1 tablet (4 mg total) by mouth every 8 (eight) hours as needed (n/v).    MCTD (mixed connective tissue disease)  -     ergocalciferol (ERGOCALCIFEROL) 50,000 unit Cap; Take 1 capsule by mouth every 7 days.    Vitamin D deficiency  -     ergocalciferol (ERGOCALCIFEROL) 50,000 unit Cap; Take 1 capsule by mouth every 7 days.    Arthralgia, unspecified joint  -     hydroxychloroquine (PLAQUENIL) 200 mg tablet; Take 1 tablet (200 mg total) by mouth 2 (two) times daily.  -     ondansetron (ZOFRAN-ODT) 4 MG TbDL; Take 1 tablet (4 mg total) by mouth every 8 (eight) hours as needed (n/v).    Fatigue, unspecified type  -     hydroxychloroquine (PLAQUENIL) 200 mg tablet; Take 1 tablet (200 mg total) by mouth 2 (two) times daily.  -     ondansetron (ZOFRAN-ODT) 4 MG TbDL; Take 1 tablet (4 mg total) by mouth every 8 (eight) hours as needed (n/v).    Gastroesophageal reflux disease, unspecified whether esophagitis present  -     hydroxychloroquine (PLAQUENIL) 200 mg tablet; Take 1 tablet (200 mg total) by mouth 2 (two) times daily.  -     ondansetron (ZOFRAN-ODT) 4 MG TbDL; Take 1 tablet (4 mg total) by mouth every 8 (eight) hours as needed (n/v).    Chronic pain of both knees  -     ibuprofen-famotidine (DUEXIS) 800-26.6 mg Tab; Take 1 tablet by mouth 3 (three) times daily.    Allergy, sequela  -     montelukast (SINGULAIR) 10 mg tablet; Take 1 tablet (10 mg total) by mouth every evening.  -     levocetirizine (XYZAL) 5 MG tablet; Take 1 tablet (5 mg total) by mouth every  evening.    Alopecia  -     finasteride (PROPECIA) 1 mg tablet; Take 1 tablet (1 mg total) by mouth once daily.  -     minoxidiL (LONITEN) 2.5 MG tablet; Take 1 tablet (2.5 mg total) by mouth once daily.    Insomnia, unspecified type  -     doxepin (SINEQUAN) 10 MG capsule; Take 2 capsules (20 mg total) by mouth every evening.        1. Start benlysta sq if ineffective for hair growth will start Oluminant 2 mg and baby asa after labs are done.  2. Refill perocet  pt requires a pa requested  3. Labs prior to visit  4. Pt has tried naproxyn, advil, diclofenac and had GI upset.  5. Add propecia/ rogaine    More than 50% of the  40 minute encounter was spent face to face counseling the patient regarding current status and future plan of care as well as side effects  of the medications. All questions were answered to patient's satisfaction also includes  non-face to face time preparing to see the patient (eg, review of tests), Obtaining and/or reviewing separately obtained history, Documenting clinical information in the electronic or other health record, Independently interpreting results     Follow-up 4 months

## 2024-04-18 ENCOUNTER — TELEPHONE (OUTPATIENT)
Dept: RHEUMATOLOGY | Facility: CLINIC | Age: 29
End: 2024-04-18
Payer: MEDICAID

## 2024-04-18 NOTE — TELEPHONE ENCOUNTER
----- Message from Akash Greenfield sent at 4/15/2024  4:08 PM CDT -----  Contact: self  Type: Needs Medical Advice  Who Called:  PT      [id number] [place] [place] [address] AT [facility] [address]  [address] [facility] [address]  Phone: [phone] Fax: [phone]      Best Call Back Number: [phone]   Additional Information: Calling because [facility] states they have not received any of the RX's that were sent over today.  Please advise.

## 2024-04-18 NOTE — TELEPHONE ENCOUNTER
Attempted to return pt call regarding scripts not received by pharmacy. Spoke with pharmacy and confirmed all scripts were received and pt already picked up percocet script. Unable to reach pt to inform scripts were ready for  mail box is full.

## 2024-04-21 RX ORDER — TRIAMCINOLONE ACETONIDE 1 MG/G
OINTMENT TOPICAL 2 TIMES DAILY
Qty: 80 G | Refills: 3 | Status: SHIPPED | OUTPATIENT
Start: 2024-04-21 | End: 2024-10-18

## 2024-04-21 RX ORDER — DICLOFENAC SODIUM AND MISOPROSTOL 75; 200 MG/1; UG/1
1 TABLET, DELAYED RELEASE ORAL 2 TIMES DAILY
Qty: 60 TABLET | Refills: 11 | Status: SHIPPED | OUTPATIENT
Start: 2024-04-21 | End: 2024-05-22

## 2024-05-20 ENCOUNTER — PATIENT MESSAGE (OUTPATIENT)
Dept: RHEUMATOLOGY | Facility: CLINIC | Age: 29
End: 2024-05-20
Payer: MEDICAID

## 2024-05-22 ENCOUNTER — OFFICE VISIT (OUTPATIENT)
Dept: RHEUMATOLOGY | Facility: CLINIC | Age: 29
End: 2024-05-22
Payer: MEDICAID

## 2024-05-22 DIAGNOSIS — L65.9 ALOPECIA: ICD-10-CM

## 2024-05-22 DIAGNOSIS — E55.9 VITAMIN D DEFICIENCY: ICD-10-CM

## 2024-05-22 DIAGNOSIS — F41.9 ANXIETY: ICD-10-CM

## 2024-05-22 DIAGNOSIS — G45.9 TIA (TRANSIENT ISCHEMIC ATTACK): ICD-10-CM

## 2024-05-22 DIAGNOSIS — M32.9 SYSTEMIC LUPUS ERYTHEMATOSUS, UNSPECIFIED SLE TYPE, UNSPECIFIED ORGAN INVOLVEMENT STATUS: Primary | ICD-10-CM

## 2024-05-22 DIAGNOSIS — M35.1 MCTD (MIXED CONNECTIVE TISSUE DISEASE): ICD-10-CM

## 2024-05-22 DIAGNOSIS — G89.4 CHRONIC PAIN SYNDROME: ICD-10-CM

## 2024-05-22 DIAGNOSIS — M08.80 JIA (JUVENILE IDIOPATHIC ARTHRITIS): ICD-10-CM

## 2024-05-22 PROCEDURE — 99215 OFFICE O/P EST HI 40 MIN: CPT | Mod: 95,,, | Performed by: INTERNAL MEDICINE

## 2024-05-22 PROCEDURE — 1160F RVW MEDS BY RX/DR IN RCRD: CPT | Mod: CPTII,95,, | Performed by: INTERNAL MEDICINE

## 2024-05-22 PROCEDURE — 1159F MED LIST DOCD IN RCRD: CPT | Mod: CPTII,95,, | Performed by: INTERNAL MEDICINE

## 2024-05-22 RX ORDER — PREDNISONE 2.5 MG/1
5 TABLET ORAL DAILY PRN
Qty: 60 TABLET | Refills: 3 | Status: SHIPPED | OUTPATIENT
Start: 2024-05-22 | End: 2024-08-20

## 2024-05-22 RX ORDER — OXYCODONE AND ACETAMINOPHEN 10; 325 MG/1; MG/1
1 TABLET ORAL EVERY 8 HOURS PRN
Qty: 90 TABLET | Refills: 0 | Status: SHIPPED | OUTPATIENT
Start: 2024-06-10 | End: 2024-07-10

## 2024-05-22 RX ORDER — FINASTERIDE 1 MG/1
1 TABLET, FILM COATED ORAL DAILY
Qty: 90 TABLET | Refills: 3 | Status: SHIPPED | OUTPATIENT
Start: 2024-05-22 | End: 2025-05-17

## 2024-05-22 RX ORDER — OXYCODONE AND ACETAMINOPHEN 10; 325 MG/1; MG/1
1 TABLET ORAL EVERY 8 HOURS PRN
Qty: 90 TABLET | Refills: 0 | Status: SHIPPED | OUTPATIENT
Start: 2024-08-10 | End: 2024-09-09

## 2024-05-22 RX ORDER — ERGOCALCIFEROL 1.25 MG/1
50000 CAPSULE ORAL
Qty: 4 CAPSULE | Refills: 6 | Status: SHIPPED | OUTPATIENT
Start: 2024-05-22

## 2024-05-22 RX ORDER — OXYCODONE AND ACETAMINOPHEN 10; 325 MG/1; MG/1
1 TABLET ORAL EVERY 8 HOURS PRN
Qty: 90 TABLET | Refills: 0 | Status: SHIPPED | OUTPATIENT
Start: 2024-07-10 | End: 2024-08-09

## 2024-05-22 RX ORDER — FINASTERIDE 1 MG/1
1 TABLET, FILM COATED ORAL DAILY
Qty: 90 TABLET | Refills: 3 | Status: SHIPPED | OUTPATIENT
Start: 2024-05-22 | End: 2024-05-22

## 2024-05-22 RX ORDER — PIROXICAM 20 MG/1
20 CAPSULE ORAL DAILY
Qty: 30 CAPSULE | Refills: 5 | Status: SHIPPED | OUTPATIENT
Start: 2024-05-22 | End: 2024-11-18

## 2024-05-22 RX ORDER — ESCITALOPRAM OXALATE 5 MG/1
5 TABLET ORAL DAILY
Qty: 30 TABLET | Refills: 11 | Status: SHIPPED | OUTPATIENT
Start: 2024-05-22 | End: 2025-05-22

## 2024-05-22 NOTE — PROGRESS NOTES
Subjective:     Patient ID:  Sarahy Hameed    Chief Complaint:  Disease Management     History of Present Illness:  Pt is a 28 y.o. female w/ SLE at  this time that time, she has POA of father who is in a nurssing home, has 3 kids one dx with autistic spectrum. She has continued plaquenil 200 mg bid. She complains of uncontrolled joint pain in in her wrists, elbows, knees, and low back. Pain is constant and aching in nature. She states pain is typically worse in the morning and improves some with activity then worsens. She complains of intermittent rash on the face and eczema on her elbows and behind her knees. She denies oral/nasal ulcers, chest pain, shortness of breath, or raynauds. She complains of dry eyes and dry mouth.      Current tx plaquenil     She is taking percocet prn for severe pain without side effects.      Rheumatologic History:   - Diagnosis/es:  - Positive serologies:  - Infectious screening labs:  - Previous Treatments:  - Current Treatments:   Rheumatologic History:   - Diagnosis/es:  - Positive serologies:  - Infectious screening labs:  - Previous Treatments:  - Current Treatments:     Interval History:   Hospitalization since last office visit: No    Patient Active Problem List    Diagnosis Date Noted    Systemic lupus erythematosus 01/16/2024     Past Surgical History:   Procedure Laterality Date    arm surgery Left 1998     Social History     Tobacco Use    Smoking status: Never    Smokeless tobacco: Never   Substance Use Topics    Alcohol use: No    Drug use: No     No family history on file.  Review of patient's allergies indicates:   Allergen Reactions    Tramadol        Review of Systems   Review of Systems   Constitutional:  Negative for fever and unexpected weight change.   HENT:  Negative for mouth sores and trouble swallowing.    Eyes:  Negative for redness.   Respiratory:  Negative for cough and shortness of breath.    Cardiovascular:  Negative for chest pain.   Gastrointestinal:   Negative for constipation and diarrhea.   Genitourinary:  Negative for dysuria and genital sores.   Skin:  Negative for rash.   Neurological:  Negative for headaches.   Hematological:  Does not bruise/bleed easily.        Current Medications:  Current Outpatient Medications   Medication Instructions    BENLYSTA 200 mg, Subcutaneous, Every 7 days    cholecalciferol (vitamin D3) 5,000 Units, Oral, Every 7 days    doxepin (SINEQUAN) 20 mg, Oral, Nightly    ergocalciferol (ERGOCALCIFEROL) 50,000 unit Cap Take 1 capsule by mouth every 7 days.    ergocalciferol (ERGOCALCIFEROL) 50,000 Units, Oral, Every 7 days    EScitalopram oxalate (LEXAPRO) 5 mg, Oral, Daily    finasteride (PROPECIA) 1 mg, Oral, Daily    hydroxychloroquine (PLAQUENIL) 200 mg, Oral, 2 times daily    levocetirizine (XYZAL) 5 mg, Oral, Nightly    minoxidiL (LONITEN) 2.5 mg, Oral, Daily    montelukast (SINGULAIR) 10 mg, Oral, Nightly    ondansetron (ZOFRAN) 4 mg, Oral, 2 times daily    ondansetron (ZOFRAN-ODT) 4 mg, Oral, Every 8 hours PRN    oxyCODONE-acetaminophen (PERCOCET)  mg per tablet 1 tablet, Oral, Every 8 hours PRN    [START ON 6/11/2024] oxyCODONE-acetaminophen (PERCOCET)  mg per tablet 1 tablet, Oral, Every 8 hours PRN    [START ON 6/10/2024] oxyCODONE-acetaminophen (PERCOCET)  mg per tablet 1 tablet, Oral, Every 8 hours PRN    [START ON 7/10/2024] oxyCODONE-acetaminophen (PERCOCET)  mg per tablet 1 tablet, Oral, Every 8 hours PRN    [START ON 8/10/2024] oxyCODONE-acetaminophen (PERCOCET)  mg per tablet 1 tablet, Oral, Every 8 hours PRN    piroxicam (FELDENE) 20 mg, Oral, Daily    predniSONE (DELTASONE) 5 mg, Oral, Daily PRN    triamcinolone acetonide 0.1% (KENALOG) 0.1 % ointment Apply topically to the affected area 2 (two) times daily.         Objective:     There were no vitals filed for this visit.   There is no height or weight on file to calculate BMI.     Physical Examinations:  Physical Exam  "  Constitutional: She is oriented to person, place, and time.   Neurological: She is alert and oriented to person, place, and time.   Psychiatric: Mood, affect and judgment normal.        Disease Assessment Scores:  Patient's Global Assessment of arthritis (0-10): 2  Physician's Global Assessment of arthritis (0-10): 2  Number of Tender Joints (0-28): 2  Number of Swollen Joints (0-28): 4        5/22/2024     8:40 AM   Rapid3 Question Responses and Scores   MDHAQ Score 0   Psychologic Score 7.7   Pain Score 6.5   When you awakened in the morning OVER THE LAST WEEK, did you feel stiff? No   Fatigue Score 0   Global Health Score 2   RAPID3 Score 2.83       Monitoring Lab Results:  Lab Results   Component Value Date    WBC 5.79 07/13/2023    RBC 4.50 07/13/2023    HGB 11.4 (L) 07/13/2023    HCT 38.0 07/13/2023    MCV 84 07/13/2023    MCH 25.3 (L) 07/13/2023    MCHC 30.0 (L) 07/13/2023    RDW 14.3 07/13/2023     07/13/2023        Lab Results   Component Value Date     01/01/2024    K 3.7 01/01/2024    CL 98 07/13/2023    CO2 25 01/01/2024    GLU 74 07/13/2023    BUN 10 01/01/2024    CREATININE 0.61 01/01/2024    CALCIUM 8.8 (L) 01/01/2024    PROT 8.0 (H) 07/16/2023    ALBUMIN 4.1 07/16/2023    BILITOT 0.6 07/16/2023    ALKPHOS 73 07/16/2023    AST 14 07/16/2023    ALT 12 07/16/2023    ANIONGAP 9 01/01/2024    EGFRNORACEVR >60.0 07/13/2023       Lab Results   Component Value Date    SEDRATE 62 (H) 07/13/2023        Lab Results   Component Value Date    OFWOWHZR66OA 11 (L) 07/13/2023    EFDRHUOT17 >2000 (H) 07/13/2023        Lab Results   Component Value Date    CHOL 138 01/23/2023    HDL 45 01/23/2023    LDLCALC 74 01/23/2023    TRIG 100 01/23/2023       Lab Results   Component Value Date    CCPANTIBODIE 0.7 07/13/2023     Lab Results   Component Value Date    ANASCREEN Positive (A) 07/13/2023    ANATITER >=1:2560 07/13/2023    DSDNA Negative 1:10 07/13/2023     No results found for: "HLABB27"    Infectious " "Disease Screening:  Lab Results   Component Value Date    HEPBSAG Non-reactive 07/13/2023    HEPBCAB Non-reactive 07/13/2023    HEPBSAB <3.00 07/13/2023    HEPBSAB Non-reactive 07/13/2023    HEPBSURFABQU Negative 07/13/2023    HEPBSURFABQU <3 07/13/2023     Lab Results   Component Value Date    HEPCAB Non-reactive 07/13/2023     Lab Results   Component Value Date    TBGOLDPLUS Negative 07/13/2023     No results found for: "QUANTIFERON", "SVCMT", "QUANTAGVALUE", "QUANTNILVALU", "QUANTMITOGEN", "QFTTBAG", "QINT"     Imaging:  DEXA, Xrays, MRIs, CTs, etc    Old & Outside Medical Records:  Reviewed old and all outside medical records available in Care Everywhere     Assessment:     Pt is a 28 y.o. female with systemic lupus erythematosus. The patient has not achieved clinical remission. Plan is to continue hydroxychloroquine (PLAQUENIL) and discontinue belimumab (BENLYSTA).      Plan:      Encounter Diagnoses   Name Primary?    Systemic lupus erythematosus, unspecified SLE type, unspecified organ involvement status Yes    Chronic pain syndrome     KEMAR (juvenile idiopathic arthritis)     TIA (transient ischemic attack)     MCTD (mixed connective tissue disease)     Alopecia     Anxiety     Vitamin D deficiency    Diagnoses and all orders for this visit:    Systemic lupus erythematosus, unspecified SLE type, unspecified organ involvement status  -     Cardiolipin antibody; Future  -     Beta-2 Glycoprotein Abs (IgA, IgG, IgM); Future  -     Antithrombin III; Future  -     Protein S Activity; Future  -     Protein C Activity; Future  -     Factor 5 leiden; Future  -     DRVVT; Future  -     Homocysteine, Serum; Future  -     Sedimentation rate; Future  -     C-Reactive Protein; Future  -     Comprehensive Metabolic Panel; Future  -     CBC Auto Differential; Future  -     oxyCODONE-acetaminophen (PERCOCET)  mg per tablet; Take 1 tablet by mouth every 8 (eight) hours as needed for Pain.  -     oxyCODONE-acetaminophen " (PERCOCET)  mg per tablet; Take 1 tablet by mouth every 8 (eight) hours as needed for Pain.  -     oxyCODONE-acetaminophen (PERCOCET)  mg per tablet; Take 1 tablet by mouth every 8 (eight) hours as needed for Pain.  -     piroxicam (FELDENE) 20 MG capsule; Take 1 capsule (20 mg total) by mouth once daily.    Chronic pain syndrome  -     Cardiolipin antibody; Future  -     Beta-2 Glycoprotein Abs (IgA, IgG, IgM); Future  -     Antithrombin III; Future  -     Protein S Activity; Future  -     Protein C Activity; Future  -     Factor 5 leiden; Future  -     DRVVT; Future  -     Homocysteine, Serum; Future  -     Sedimentation rate; Future  -     C-Reactive Protein; Future  -     Comprehensive Metabolic Panel; Future  -     CBC Auto Differential; Future  -     oxyCODONE-acetaminophen (PERCOCET)  mg per tablet; Take 1 tablet by mouth every 8 (eight) hours as needed for Pain.  -     oxyCODONE-acetaminophen (PERCOCET)  mg per tablet; Take 1 tablet by mouth every 8 (eight) hours as needed for Pain.  -     oxyCODONE-acetaminophen (PERCOCET)  mg per tablet; Take 1 tablet by mouth every 8 (eight) hours as needed for Pain.  -     piroxicam (FELDENE) 20 MG capsule; Take 1 capsule (20 mg total) by mouth once daily.    KEMAR (juvenile idiopathic arthritis)  -     Cardiolipin antibody; Future  -     Beta-2 Glycoprotein Abs (IgA, IgG, IgM); Future  -     Antithrombin III; Future  -     Protein S Activity; Future  -     Protein C Activity; Future  -     Factor 5 leiden; Future  -     DRVVT; Future  -     Homocysteine, Serum; Future  -     Sedimentation rate; Future  -     C-Reactive Protein; Future  -     Comprehensive Metabolic Panel; Future  -     CBC Auto Differential; Future  -     oxyCODONE-acetaminophen (PERCOCET)  mg per tablet; Take 1 tablet by mouth every 8 (eight) hours as needed for Pain.  -     oxyCODONE-acetaminophen (PERCOCET)  mg per tablet; Take 1 tablet by mouth every 8 (eight)  hours as needed for Pain.  -     oxyCODONE-acetaminophen (PERCOCET)  mg per tablet; Take 1 tablet by mouth every 8 (eight) hours as needed for Pain.  -     piroxicam (FELDENE) 20 MG capsule; Take 1 capsule (20 mg total) by mouth once daily.    TIA (transient ischemic attack)  -     Cardiolipin antibody; Future  -     Beta-2 Glycoprotein Abs (IgA, IgG, IgM); Future  -     Antithrombin III; Future  -     Protein S Activity; Future  -     Protein C Activity; Future  -     Factor 5 leiden; Future  -     DRVVT; Future  -     Homocysteine, Serum; Future  -     Sedimentation rate; Future  -     C-Reactive Protein; Future  -     Comprehensive Metabolic Panel; Future  -     CBC Auto Differential; Future  -     piroxicam (FELDENE) 20 MG capsule; Take 1 capsule (20 mg total) by mouth once daily.    MCTD (mixed connective tissue disease)  -     Cardiolipin antibody; Future  -     Beta-2 Glycoprotein Abs (IgA, IgG, IgM); Future  -     Antithrombin III; Future  -     Protein S Activity; Future  -     Protein C Activity; Future  -     Factor 5 leiden; Future  -     DRVVT; Future  -     Homocysteine, Serum; Future  -     Sedimentation rate; Future  -     C-Reactive Protein; Future  -     Comprehensive Metabolic Panel; Future  -     CBC Auto Differential; Future    Alopecia  -     Discontinue: finasteride (PROPECIA) 1 mg tablet; Take 1 tablet (1 mg total) by mouth once daily.  -     finasteride (PROPECIA) 1 mg tablet; Take 1 tablet (1 mg total) by mouth once daily.  -     piroxicam (FELDENE) 20 MG capsule; Take 1 capsule (20 mg total) by mouth once daily.  -     ergocalciferol (ERGOCALCIFEROL) 50,000 unit Cap; Take 1 capsule (50,000 Units total) by mouth every 7 days.  -     predniSONE (DELTASONE) 2.5 MG tablet; Take 2 tablets (5 mg total) by mouth daily as needed (flare).    Anxiety  -     EScitalopram oxalate (LEXAPRO) 5 MG Tab; Take 1 tablet (5 mg total) by mouth once daily.    Vitamin D deficiency  -     ergocalciferol  (ERGOCALCIFEROL) 50,000 unit Cap; Take 1 capsule (50,000 Units total) by mouth every 7 days.  -     predniSONE (DELTASONE) 2.5 MG tablet; Take 2 tablets (5 mg total) by mouth daily as needed (flare).          1. Hold benlysta now  2. Continue plaquenil, perocet  3. Labs for clotting disorder     Follow-up 4 months    The patient location is: home  The chief complaint leading to consultation is: sle    Visit type: audiovisual    Face to Face time with patient: 47  minutes of total time spent on the encounter, which includes face to face time and non-face to face time preparing to see the patient (eg, review of tests), Obtaining and/or reviewing separately obtained history, Documenting clinical information in the electronic or other health record, Independently interpreting results (not separately reported) and communicating results to the patient/family/caregiver, or Care coordination (not separately reported).         Each patient to whom he or she provides medical services by telemedicine is:  (1) informed of the relationship between the physician and patient and the respective role of any other health care provider with respect to management of the patient; and (2) notified that he or she may decline to receive medical services by telemedicine and may withdraw from such care at any time.    Notes:

## 2024-08-06 ENCOUNTER — OFFICE VISIT (OUTPATIENT)
Dept: RHEUMATOLOGY | Facility: CLINIC | Age: 29
End: 2024-08-06
Payer: MEDICAID

## 2024-08-06 VITALS
HEART RATE: 90 BPM | SYSTOLIC BLOOD PRESSURE: 130 MMHG | WEIGHT: 147 LBS | DIASTOLIC BLOOD PRESSURE: 75 MMHG | BODY MASS INDEX: 26.54 KG/M2

## 2024-08-06 DIAGNOSIS — G89.4 CHRONIC PAIN SYNDROME: ICD-10-CM

## 2024-08-06 DIAGNOSIS — L30.9 ECZEMA, UNSPECIFIED TYPE: ICD-10-CM

## 2024-08-06 DIAGNOSIS — F41.9 ANXIETY: ICD-10-CM

## 2024-08-06 DIAGNOSIS — B96.89 BACTERIAL VAGINOSIS: ICD-10-CM

## 2024-08-06 DIAGNOSIS — M32.9 SYSTEMIC LUPUS ERYTHEMATOSUS, UNSPECIFIED SLE TYPE, UNSPECIFIED ORGAN INVOLVEMENT STATUS: Primary | ICD-10-CM

## 2024-08-06 DIAGNOSIS — N76.0 BACTERIAL VAGINOSIS: ICD-10-CM

## 2024-08-06 PROCEDURE — 3075F SYST BP GE 130 - 139MM HG: CPT | Mod: CPTII,,, | Performed by: PHYSICIAN ASSISTANT

## 2024-08-06 PROCEDURE — 99215 OFFICE O/P EST HI 40 MIN: CPT | Mod: S$PBB,,, | Performed by: PHYSICIAN ASSISTANT

## 2024-08-06 PROCEDURE — 1159F MED LIST DOCD IN RCRD: CPT | Mod: CPTII,,, | Performed by: PHYSICIAN ASSISTANT

## 2024-08-06 PROCEDURE — 99999 PR PBB SHADOW E&M-EST. PATIENT-LVL III: CPT | Mod: PBBFAC,,, | Performed by: PHYSICIAN ASSISTANT

## 2024-08-06 PROCEDURE — 1160F RVW MEDS BY RX/DR IN RCRD: CPT | Mod: CPTII,,, | Performed by: PHYSICIAN ASSISTANT

## 2024-08-06 PROCEDURE — 3078F DIAST BP <80 MM HG: CPT | Mod: CPTII,,, | Performed by: PHYSICIAN ASSISTANT

## 2024-08-06 PROCEDURE — 3008F BODY MASS INDEX DOCD: CPT | Mod: CPTII,,, | Performed by: PHYSICIAN ASSISTANT

## 2024-08-06 PROCEDURE — 99213 OFFICE O/P EST LOW 20 MIN: CPT | Mod: PBBFAC,PO | Performed by: PHYSICIAN ASSISTANT

## 2024-08-06 RX ORDER — METRONIDAZOLE VAGINAL GEL, 1.3 % 65 MG/5G
65 GEL VAGINAL ONCE
Qty: 5 G | Refills: 0 | Status: SHIPPED | OUTPATIENT
Start: 2024-08-06 | End: 2024-08-06

## 2024-08-06 RX ORDER — TRIAMCINOLONE ACETONIDE 1 MG/G
OINTMENT TOPICAL 2 TIMES DAILY
Qty: 80 G | Refills: 3 | Status: SHIPPED | OUTPATIENT
Start: 2024-08-06 | End: 2025-02-02

## 2024-08-06 RX ORDER — ONDANSETRON 4 MG/1
4 TABLET, ORALLY DISINTEGRATING ORAL EVERY 8 HOURS PRN
Qty: 40 TABLET | Refills: 3 | Status: SHIPPED | OUTPATIENT
Start: 2024-08-06 | End: 2025-02-02

## 2024-08-06 RX ORDER — MELOXICAM 15 MG/1
15 TABLET ORAL DAILY
COMMUNITY
Start: 2024-04-15

## 2024-08-06 RX ORDER — HYDROXYCHLOROQUINE SULFATE 200 MG/1
200 TABLET, FILM COATED ORAL 2 TIMES DAILY
Qty: 60 TABLET | Refills: 5 | Status: SHIPPED | OUTPATIENT
Start: 2024-08-06 | End: 2025-02-02

## 2024-08-06 RX ORDER — HYDROCORTISONE 1 G/100G
CREAM TOPICAL
COMMUNITY
Start: 2024-07-17

## 2024-08-06 RX ORDER — BUSPIRONE HYDROCHLORIDE 5 MG/1
5 TABLET ORAL 2 TIMES DAILY
Qty: 60 TABLET | Refills: 3 | Status: SHIPPED | OUTPATIENT
Start: 2024-08-06 | End: 2025-08-06

## 2024-08-06 ASSESSMENT — ROUTINE ASSESSMENT OF PATIENT INDEX DATA (RAPID3)
PAIN SCORE: 7
PSYCHOLOGICAL DISTRESS SCORE: 2.2
MDHAQ FUNCTION SCORE: 0
FATIGUE SCORE: 1.1

## 2024-09-08 DIAGNOSIS — M32.9 SYSTEMIC LUPUS ERYTHEMATOSUS, UNSPECIFIED SLE TYPE, UNSPECIFIED ORGAN INVOLVEMENT STATUS: ICD-10-CM

## 2024-09-08 DIAGNOSIS — M08.80 JIA (JUVENILE IDIOPATHIC ARTHRITIS): ICD-10-CM

## 2024-09-08 DIAGNOSIS — G89.4 CHRONIC PAIN SYNDROME: ICD-10-CM

## 2024-09-09 ENCOUNTER — PATIENT MESSAGE (OUTPATIENT)
Dept: RHEUMATOLOGY | Facility: CLINIC | Age: 29
End: 2024-09-09
Payer: MEDICAID

## 2024-09-09 RX ORDER — OXYCODONE AND ACETAMINOPHEN 10; 325 MG/1; MG/1
1 TABLET ORAL EVERY 8 HOURS PRN
Qty: 90 TABLET | Refills: 0 | Status: SHIPPED | OUTPATIENT
Start: 2024-09-09 | End: 2024-10-09

## 2024-09-09 NOTE — TELEPHONE ENCOUNTER
----- Message from Geeta Jean Baptiste sent at 9/9/2024 10:34 AM CDT -----  Regarding: refill  Type:  RX Refill Request    Who Called: pt    Refill or New Rx:refill    RX Name and Strength:oxyCODONE-acetaminophen (PERCOCET)  mg per tablet 90 tablet 0 8/10/2024 9/9/2024   Sig - Route: Take 1 tablet by mouth every 8 (eight) hours as needed for Pain. - Oral   Sent to pharmacy as: oxyCODONE-acetaminophen (PERCOCET)  mg per tablet   Earliest Fill Date: 8/10/2024   Notes to Pharmacy: Quantity prescribed more than 7 day supply? Yes, quantity medically necessary   E-Prescribing Status: Receipt confirmed by pharmacy (5/22/2024 10:54 AM CDT)   No prior authorization was found for this prescription.   Found prior authorization for another prescription for the same medication: Approved         How is the patient currently taking it? (ex. 1XDay):see above    Is this a 30 day or 90 day RX:see above    Preferred Pharmacy with phone number:    [id number] - [facility] [address] AT [facility] [address] [facility] [address]  [address] [facility] [address]  Phone: [phone] Fax: [phone]        Local or Mail Order:local    Ordering Provider:emma Schofield Call Back Number:[phone]      Additional Information: pt wants a call back from nurse when done.  Please call to discuss.

## 2024-09-09 NOTE — TELEPHONE ENCOUNTER
----- Message from Theresa Hutchison sent at 9/9/2024  7:18 AM CDT -----  Regarding: pt advise  Contact: pt  Patient requesting to speak w/ you regarding medication  ( oxyCODONE-acetaminophen (PERCOCET)  mg per tablet     Please call patient    Phone  [phone]    Thank You

## 2024-10-05 DIAGNOSIS — M08.80 JIA (JUVENILE IDIOPATHIC ARTHRITIS): ICD-10-CM

## 2024-10-05 DIAGNOSIS — M32.9 SYSTEMIC LUPUS ERYTHEMATOSUS, UNSPECIFIED SLE TYPE, UNSPECIFIED ORGAN INVOLVEMENT STATUS: ICD-10-CM

## 2024-10-05 DIAGNOSIS — G89.4 CHRONIC PAIN SYNDROME: ICD-10-CM

## 2024-10-07 NOTE — TELEPHONE ENCOUNTER
----- Message from Chhaya sent at 10/7/2024  9:49 AM CDT -----  Contact: pt  Type:  RX Refill Request    Who Called:  pt  Refill or New Rx:   refill  RX Name and Strength:   oxyCODONE-acetaminophen (PERCOCET)  mg per tablet  How is the patient currently taking it? (ex. 1XDay):  as ordered  Is this a 30 day or 90 day RX:  30  Preferred Pharmacy with phone number:      [id number] - [place] - [address] [facility] [address]  [address] [address]  Phone: [phone] Fax: [phone]    Local or Mail Order:   local  Ordering Provider:   emma Schofield Call Back Number:  [phone]  Additional Information:  pt is asking for a 2mth supply.

## 2024-10-08 RX ORDER — OXYCODONE AND ACETAMINOPHEN 10; 325 MG/1; MG/1
1 TABLET ORAL EVERY 8 HOURS PRN
Qty: 90 TABLET | Refills: 0 | Status: SHIPPED | OUTPATIENT
Start: 2024-10-08 | End: 2024-11-07

## 2024-10-22 ENCOUNTER — LAB VISIT (OUTPATIENT)
Dept: LAB | Facility: HOSPITAL | Age: 29
End: 2024-10-22
Attending: INTERNAL MEDICINE
Payer: MEDICAID

## 2024-10-22 ENCOUNTER — OFFICE VISIT (OUTPATIENT)
Dept: RHEUMATOLOGY | Facility: CLINIC | Age: 29
End: 2024-10-22
Payer: MEDICAID

## 2024-10-22 VITALS
HEIGHT: 62 IN | WEIGHT: 145.75 LBS | BODY MASS INDEX: 26.82 KG/M2 | SYSTOLIC BLOOD PRESSURE: 111 MMHG | HEART RATE: 54 BPM | DIASTOLIC BLOOD PRESSURE: 66 MMHG

## 2024-10-22 DIAGNOSIS — L65.9 ALOPECIA: ICD-10-CM

## 2024-10-22 DIAGNOSIS — J32.9 SINUSITIS, UNSPECIFIED CHRONICITY, UNSPECIFIED LOCATION: ICD-10-CM

## 2024-10-22 DIAGNOSIS — M08.80 JIA (JUVENILE IDIOPATHIC ARTHRITIS): ICD-10-CM

## 2024-10-22 DIAGNOSIS — M32.9 SYSTEMIC LUPUS ERYTHEMATOSUS, UNSPECIFIED SLE TYPE, UNSPECIFIED ORGAN INVOLVEMENT STATUS: ICD-10-CM

## 2024-10-22 DIAGNOSIS — E55.9 VITAMIN D DEFICIENCY: ICD-10-CM

## 2024-10-22 DIAGNOSIS — G45.9 TIA (TRANSIENT ISCHEMIC ATTACK): ICD-10-CM

## 2024-10-22 DIAGNOSIS — F41.9 ANXIETY: ICD-10-CM

## 2024-10-22 DIAGNOSIS — D64.9 ANEMIA, UNSPECIFIED TYPE: ICD-10-CM

## 2024-10-22 DIAGNOSIS — G89.4 CHRONIC PAIN SYNDROME: ICD-10-CM

## 2024-10-22 DIAGNOSIS — M32.9 SYSTEMIC LUPUS ERYTHEMATOSUS, UNSPECIFIED SLE TYPE, UNSPECIFIED ORGAN INVOLVEMENT STATUS: Primary | ICD-10-CM

## 2024-10-22 LAB
ALBUMIN SERPL BCP-MCNC: 3.9 G/DL (ref 3.5–5.2)
ALP SERPL-CCNC: 72 U/L (ref 40–150)
ALT SERPL W/O P-5'-P-CCNC: 9 U/L (ref 10–44)
ANION GAP SERPL CALC-SCNC: 7 MMOL/L (ref 8–16)
AST SERPL-CCNC: 12 U/L (ref 10–40)
BASOPHILS # BLD AUTO: 0.02 K/UL (ref 0–0.2)
BASOPHILS NFR BLD: 0.5 % (ref 0–1.9)
BILIRUB SERPL-MCNC: 0.3 MG/DL (ref 0.1–1)
BILIRUB UR QL STRIP: NEGATIVE
BUN SERPL-MCNC: 10 MG/DL (ref 6–20)
C3 SERPL-MCNC: 108 MG/DL (ref 50–180)
C4 SERPL-MCNC: 39 MG/DL (ref 11–44)
CALCIUM SERPL-MCNC: 9.3 MG/DL (ref 8.7–10.5)
CHLORIDE SERPL-SCNC: 107 MMOL/L (ref 95–110)
CLARITY UR: CLEAR
CO2 SERPL-SCNC: 26 MMOL/L (ref 23–29)
COLOR UR: YELLOW
CREAT SERPL-MCNC: 0.7 MG/DL (ref 0.5–1.4)
DIFFERENTIAL METHOD BLD: ABNORMAL
EOSINOPHIL # BLD AUTO: 0.1 K/UL (ref 0–0.5)
EOSINOPHIL NFR BLD: 2.5 % (ref 0–8)
ERYTHROCYTE [DISTWIDTH] IN BLOOD BY AUTOMATED COUNT: 14.8 % (ref 11.5–14.5)
ERYTHROCYTE [SEDIMENTATION RATE] IN BLOOD BY PHOTOMETRIC METHOD: 36 MM/HR (ref 0–36)
EST. GFR  (NO RACE VARIABLE): >60 ML/MIN/1.73 M^2
GLUCOSE SERPL-MCNC: 72 MG/DL (ref 70–110)
GLUCOSE UR QL STRIP: NEGATIVE
HBV CORE AB SERPL QL IA: NORMAL
HBV SURFACE AB SER-ACNC: <3 MIU/ML
HBV SURFACE AB SER-ACNC: NORMAL M[IU]/ML
HBV SURFACE AG SERPL QL IA: NORMAL
HCT VFR BLD AUTO: 32.2 % (ref 37–48.5)
HCV AB SERPL QL IA: NORMAL
HGB BLD-MCNC: 9 G/DL (ref 12–16)
HGB UR QL STRIP: NEGATIVE
IMM GRANULOCYTES # BLD AUTO: 0.01 K/UL (ref 0–0.04)
IMM GRANULOCYTES NFR BLD AUTO: 0.2 % (ref 0–0.5)
IRON SERPL-MCNC: 14 UG/DL (ref 30–160)
KETONES UR QL STRIP: NEGATIVE
LEUKOCYTE ESTERASE UR QL STRIP: NEGATIVE
LYMPHOCYTES # BLD AUTO: 1.8 K/UL (ref 1–4.8)
LYMPHOCYTES NFR BLD: 45.2 % (ref 18–48)
MCH RBC QN AUTO: 23.5 PG (ref 27–31)
MCHC RBC AUTO-ENTMCNC: 28 G/DL (ref 32–36)
MCV RBC AUTO: 84 FL (ref 82–98)
MONOCYTES # BLD AUTO: 0.2 K/UL (ref 0.3–1)
MONOCYTES NFR BLD: 4.4 % (ref 4–15)
NEUTROPHILS # BLD AUTO: 1.9 K/UL (ref 1.8–7.7)
NEUTROPHILS NFR BLD: 47.2 % (ref 38–73)
NITRITE UR QL STRIP: NEGATIVE
NRBC BLD-RTO: 0 /100 WBC
PH UR STRIP: 8 [PH] (ref 5–8)
PLATELET # BLD AUTO: 286 K/UL (ref 150–450)
PMV BLD AUTO: 12.5 FL (ref 9.2–12.9)
POTASSIUM SERPL-SCNC: 4 MMOL/L (ref 3.5–5.1)
PROT SERPL-MCNC: 8.5 G/DL (ref 6–8.4)
PROT UR QL STRIP: NEGATIVE
RBC # BLD AUTO: 3.83 M/UL (ref 4–5.4)
SATURATED IRON: 3 % (ref 20–50)
SODIUM SERPL-SCNC: 140 MMOL/L (ref 136–145)
SP GR UR STRIP: 1.02 (ref 1–1.03)
TOTAL IRON BINDING CAPACITY: 506 UG/DL (ref 250–450)
TRANSFERRIN SERPL-MCNC: 342 MG/DL (ref 200–375)
TRANSFERRIN SERPL-MCNC: 342 MG/DL (ref 200–375)
URN SPEC COLLECT METH UR: NORMAL
WBC # BLD AUTO: 4.07 K/UL (ref 3.9–12.7)

## 2024-10-22 PROCEDURE — 86706 HEP B SURFACE ANTIBODY: CPT | Mod: 91 | Performed by: INTERNAL MEDICINE

## 2024-10-22 PROCEDURE — 83540 ASSAY OF IRON: CPT | Performed by: INTERNAL MEDICINE

## 2024-10-22 PROCEDURE — 86480 TB TEST CELL IMMUN MEASURE: CPT | Performed by: INTERNAL MEDICINE

## 2024-10-22 PROCEDURE — 86038 ANTINUCLEAR ANTIBODIES: CPT | Performed by: INTERNAL MEDICINE

## 2024-10-22 PROCEDURE — 83516 IMMUNOASSAY NONANTIBODY: CPT | Performed by: INTERNAL MEDICINE

## 2024-10-22 PROCEDURE — 99213 OFFICE O/P EST LOW 20 MIN: CPT | Mod: PBBFAC,PO | Performed by: INTERNAL MEDICINE

## 2024-10-22 PROCEDURE — 86235 NUCLEAR ANTIGEN ANTIBODY: CPT | Mod: 59 | Performed by: INTERNAL MEDICINE

## 2024-10-22 PROCEDURE — 1160F RVW MEDS BY RX/DR IN RCRD: CPT | Mod: CPTII,,, | Performed by: INTERNAL MEDICINE

## 2024-10-22 PROCEDURE — 86160 COMPLEMENT ANTIGEN: CPT | Mod: 59 | Performed by: PHYSICIAN ASSISTANT

## 2024-10-22 PROCEDURE — 84466 ASSAY OF TRANSFERRIN: CPT | Performed by: INTERNAL MEDICINE

## 2024-10-22 PROCEDURE — 86704 HEP B CORE ANTIBODY TOTAL: CPT | Performed by: INTERNAL MEDICINE

## 2024-10-22 PROCEDURE — 3008F BODY MASS INDEX DOCD: CPT | Mod: CPTII,,, | Performed by: INTERNAL MEDICINE

## 2024-10-22 PROCEDURE — 87340 HEPATITIS B SURFACE AG IA: CPT | Performed by: INTERNAL MEDICINE

## 2024-10-22 PROCEDURE — 86039 ANTINUCLEAR ANTIBODIES (ANA): CPT | Performed by: PHYSICIAN ASSISTANT

## 2024-10-22 PROCEDURE — 86235 NUCLEAR ANTIGEN ANTIBODY: CPT | Performed by: INTERNAL MEDICINE

## 2024-10-22 PROCEDURE — 86225 DNA ANTIBODY NATIVE: CPT | Mod: 59 | Performed by: PHYSICIAN ASSISTANT

## 2024-10-22 PROCEDURE — 99999 PR PBB SHADOW E&M-EST. PATIENT-LVL III: CPT | Mod: PBBFAC,,, | Performed by: INTERNAL MEDICINE

## 2024-10-22 PROCEDURE — 85025 COMPLETE CBC W/AUTO DIFF WBC: CPT | Performed by: INTERNAL MEDICINE

## 2024-10-22 PROCEDURE — 3078F DIAST BP <80 MM HG: CPT | Mod: CPTII,,, | Performed by: INTERNAL MEDICINE

## 2024-10-22 PROCEDURE — 85652 RBC SED RATE AUTOMATED: CPT | Performed by: INTERNAL MEDICINE

## 2024-10-22 PROCEDURE — 1159F MED LIST DOCD IN RCRD: CPT | Mod: CPTII,,, | Performed by: INTERNAL MEDICINE

## 2024-10-22 PROCEDURE — 86803 HEPATITIS C AB TEST: CPT | Performed by: INTERNAL MEDICINE

## 2024-10-22 PROCEDURE — 80053 COMPREHEN METABOLIC PANEL: CPT | Performed by: INTERNAL MEDICINE

## 2024-10-22 PROCEDURE — 99214 OFFICE O/P EST MOD 30 MIN: CPT | Mod: S$PBB,,, | Performed by: INTERNAL MEDICINE

## 2024-10-22 PROCEDURE — 3074F SYST BP LT 130 MM HG: CPT | Mod: CPTII,,, | Performed by: INTERNAL MEDICINE

## 2024-10-22 PROCEDURE — 86225 DNA ANTIBODY NATIVE: CPT | Performed by: INTERNAL MEDICINE

## 2024-10-22 PROCEDURE — 86160 COMPLEMENT ANTIGEN: CPT | Performed by: PHYSICIAN ASSISTANT

## 2024-10-22 PROCEDURE — 81003 URINALYSIS AUTO W/O SCOPE: CPT | Mod: PO | Performed by: INTERNAL MEDICINE

## 2024-10-22 RX ORDER — OXYCODONE AND ACETAMINOPHEN 10; 325 MG/1; MG/1
1 TABLET ORAL EVERY 8 HOURS PRN
Qty: 90 TABLET | Refills: 0 | Status: SHIPPED | OUTPATIENT
Start: 2024-11-05 | End: 2024-12-05

## 2024-10-22 RX ORDER — HYDROXYCHLOROQUINE SULFATE 200 MG/1
200 TABLET, FILM COATED ORAL 2 TIMES DAILY
Qty: 60 TABLET | Refills: 5 | Status: SHIPPED | OUTPATIENT
Start: 2024-10-22 | End: 2025-04-20

## 2024-10-22 RX ORDER — OXYCODONE AND ACETAMINOPHEN 10; 325 MG/1; MG/1
1 TABLET ORAL EVERY 8 HOURS PRN
Qty: 90 TABLET | Refills: 0 | Status: SHIPPED | OUTPATIENT
Start: 2025-01-04 | End: 2025-02-03

## 2024-10-22 RX ORDER — FINASTERIDE 1 MG/1
1 TABLET, FILM COATED ORAL DAILY
Qty: 30 TABLET | Refills: 6 | Status: SHIPPED | OUTPATIENT
Start: 2024-10-22 | End: 2025-05-20

## 2024-10-22 RX ORDER — AZITHROMYCIN 250 MG/1
TABLET, FILM COATED ORAL
Qty: 6 TABLET | Refills: 0 | Status: SHIPPED | OUTPATIENT
Start: 2024-10-22

## 2024-10-22 RX ORDER — BUSPIRONE HYDROCHLORIDE 10 MG/1
10 TABLET ORAL 2 TIMES DAILY
Qty: 60 TABLET | Refills: 3 | Status: SHIPPED | OUTPATIENT
Start: 2024-10-22 | End: 2025-10-22

## 2024-10-22 RX ORDER — OXYCODONE AND ACETAMINOPHEN 10; 325 MG/1; MG/1
1 TABLET ORAL EVERY 8 HOURS PRN
Qty: 90 TABLET | Refills: 0 | Status: SHIPPED | OUTPATIENT
Start: 2024-12-05 | End: 2025-01-04

## 2024-10-22 RX ORDER — ERGOCALCIFEROL 1.25 MG/1
50000 CAPSULE ORAL
Qty: 4 CAPSULE | Refills: 6 | Status: SHIPPED | OUTPATIENT
Start: 2024-10-22

## 2024-10-22 RX ORDER — BELIMUMAB 200 MG/ML
200 SOLUTION SUBCUTANEOUS
Qty: 4 ML | Refills: 11 | Status: ACTIVE | OUTPATIENT
Start: 2024-10-22

## 2024-10-22 ASSESSMENT — ROUTINE ASSESSMENT OF PATIENT INDEX DATA (RAPID3)
MDHAQ FUNCTION SCORE: 0.2
PSYCHOLOGICAL DISTRESS SCORE: 5.5
TOTAL RAPID3 SCORE: 2.56
PATIENT GLOBAL ASSESSMENT SCORE: 0.5
PAIN SCORE: 6.5

## 2024-10-22 NOTE — PROGRESS NOTES
Subjective:     Patient ID:  Sarahy Hameed    Chief Complaint:  Disease Management     History of Present Illness:  Pt is a 28 y.o. female  SLE. Benlysta put on hold at her last visit. She has continued plaquenil 200 mg twice daily. She continues to have generalized joint pain in her wrists, elbows, knees, and low back. She is taking percocet tid prn for severe pain as well. She complains of intermittent rash on the face and eczema on her elbows and behind her knees. She denies oral/nasal ulcers, chest pain, shortness of breath, or raynauds. She complains of dry eyes and dry mouth.          She is starting a new job as a  at school.      Rheumatologic History:   - Diagnosis/es:  - Positive serologies:  - Infectious screening labs:  - Previous Treatments:  - Current Treatments:     Interval History:   Hospitalization since last office visit: No    Patient Active Problem List    Diagnosis Date Noted    Systemic lupus erythematosus 01/16/2024     Past Surgical History:   Procedure Laterality Date    arm surgery Left 1998     Social History     Tobacco Use    Smoking status: Never    Smokeless tobacco: Never   Substance Use Topics    Alcohol use: No    Drug use: No     No family history on file.  Review of patient's allergies indicates:   Allergen Reactions    Tramadol        Review of Systems   Review of Systems   Constitutional:  Positive for chills and fatigue. Negative for activity change, appetite change, diaphoresis, fever and unexpected weight change.   HENT:  Negative for congestion, dental problem, ear discharge, ear pain, facial swelling, mouth sores, nosebleeds, postnasal drip, rhinorrhea, sinus pressure, sneezing, sore throat, tinnitus, trouble swallowing and voice change.    Eyes:  Negative for photophobia, pain, discharge, redness and itching.   Respiratory:  Positive for cough. Negative for apnea, chest tightness, shortness of breath and wheezing.    Cardiovascular:  Positive for leg swelling.  Negative for chest pain and palpitations.   Gastrointestinal:  Positive for abdominal pain. Negative for abdominal distention, constipation, diarrhea, nausea and vomiting.   Endocrine: Negative for cold intolerance, heat intolerance, polydipsia and polyuria.   Genitourinary:  Negative for decreased urine volume, difficulty urinating, dysuria, flank pain, frequency, hematuria and urgency.   Musculoskeletal:  Positive for arthralgias, back pain, gait problem, joint swelling, myalgias, neck pain and neck stiffness.   Skin:  Negative for pallor, rash and wound.   Allergic/Immunologic: Negative for immunocompromised state.   Neurological:  Negative for dizziness, tremors, numbness and headaches.   Hematological:  Negative for adenopathy. Does not bruise/bleed easily.   Psychiatric/Behavioral:  Negative for sleep disturbance. The patient is not nervous/anxious.         Current Medications:  Current Outpatient Medications   Medication Instructions    azithromycin (Z-ROCIO) 250 MG tablet Take 2 tablets by mouth on day 1; Take 1 tablet by mouth on days 2-5    BENLYSTA 200 mg, Subcutaneous, Every 7 days    busPIRone (BUSPAR) 10 mg, Oral, 2 times daily    cholecalciferol (vitamin D3) 5,000 Units, Every 7 days    ergocalciferol (ERGOCALCIFEROL) 50,000 Units, Oral, Every 7 days    finasteride (PROPECIA) 1 mg, Oral, Daily    hydrocortisone acetate 1 % Crea Apply 1 a small amount to affected area twice a day as needed FOR ITCHING    hydroxychloroquine (PLAQUENIL) 200 mg, Oral, 2 times daily    levocetirizine (XYZAL) 5 mg, Oral, Nightly    minoxidiL (LONITEN) 2.5 mg, Oral, Daily    ondansetron (ZOFRAN-ODT) 4 mg, Oral, Every 8 hours PRN    [START ON 11/5/2024] oxyCODONE-acetaminophen (PERCOCET)  mg per tablet 1 tablet, Oral, Every 8 hours PRN    [START ON 12/5/2024] oxyCODONE-acetaminophen (PERCOCET)  mg per tablet 1 tablet, Oral, Every 8 hours PRN    [START ON 1/4/2025] oxyCODONE-acetaminophen (PERCOCET)  mg per  "tablet 1 tablet, Oral, Every 8 hours PRN    piroxicam (FELDENE) 20 mg, Oral, Daily    triamcinolone acetonide 0.1% (KENALOG) 0.1 % ointment Apply topically to the affected area 2 (two) times daily.         Objective:     Vitals:    10/22/24 1450   BP: 111/66   Pulse: (!) 54   Weight: 66.1 kg (145 lb 11.6 oz)   Height: 5' 2.4" (1.585 m)   PainSc:   7   PainLoc: Generalized      Body mass index is 26.31 kg/m².     Physical Examinations:  Physical Exam   Constitutional: She is oriented to person, place, and time. No distress.   HENT:   Head: Normocephalic and atraumatic.   Mouth/Throat: Oropharynx is clear and moist.   Eyes: Pupils are equal, round, and reactive to light.   Neck: No thyromegaly present.   Cardiovascular: Normal rate, regular rhythm and normal heart sounds. Exam reveals no gallop and no friction rub.   No murmur heard.  Pulmonary/Chest: Breath sounds normal. She has no wheezes. She has no rales. She exhibits no tenderness.   Abdominal: There is no abdominal tenderness. There is no rebound and no guarding.   Musculoskeletal:         General: Tenderness present.      Right shoulder: Tenderness present.      Left shoulder: Tenderness present.      Right elbow: Tenderness present.      Left elbow: Tenderness present.      Right wrist: Tenderness present.      Left wrist: Tenderness present.      Cervical back: Neck supple.      Right knee: No effusion. Tenderness present.      Left knee: No effusion. Tenderness present.   Lymphadenopathy:     She has no cervical adenopathy.   Neurological: She is alert and oriented to person, place, and time.   Skin: No rash noted. No erythema. No pallor.   Psychiatric: Mood and affect normal.   Nursing note and vitals reviewed.      Right Side Rheumatological Exam     The patient is tender to palpation of the shoulder, elbow, wrist, knee, 1st PIP, 1st MCP, 2nd PIP, 2nd MCP, 3rd PIP, 3rd MCP, 4th PIP, 4th MCP, 5th PIP and 5th MCP    She has swelling of the 1st PIP, 1st MCP, " 2nd PIP, 2nd MCP, 3rd PIP, 3rd MCP, 4th PIP, 4th MCP, 5th PIP and 5th MCP    Shoulder Exam   Tenderness Location: biceps tendon and clavicle    Range of Motion   Active abduction:  abnormal   Adduction: abnormal  Sensation: normal    Knee Exam   Patellofemoral Crepitus: positive  Effusion: negative  Sensation: normal    Hip Exam   Tenderness Location: posterior, greater trochanter and lateral  Sensation: normal    Elbow/Wrist Exam   Tenderness Location: lateral epicondyle and medial epicondyle  Sensation: normal    Foot Exam   Right foot exam exhibits signs of inflamed dorsum  Right foot exam exhibits signs of no podagra, no tophus and no plantar fasciitis    Muscle Strength (0-5 scale):  : 4/5     Left Side Rheumatological Exam     The patient is tender to palpation of the shoulder, elbow, wrist, knee, 1st PIP, 1st MCP, 2nd PIP, 2nd MCP, 3rd PIP, 3rd MCP, 4th PIP, 4th MCP, 5th PIP and 5th MCP.    She has swelling of the 1st PIP, 1st MCP, 2nd PIP, 2nd MCP, 3rd PIP, 3rd MCP, 4th PIP, 4th MCP, 5th PIP and 5th MCP    Shoulder Exam   Tenderness Location: biceps tendon and clavicle    Range of Motion   Active abduction:  abnormal   Sensation: normal    Knee Exam     Patellofemoral Crepitus: positive  Effusion: negative  Sensation: normal    Hip Exam   Tenderness Location: posterior, greater trochanter and lateral  Sensation: normal    Elbow/Wrist Exam   Tenderness Location: lateral epicondyle and medial epicondyle  Sensation: normal    Foot Exam   Left foot exam exhibits signs of inflamed dorsum  Left foot exam exhibits signs of no podagra and no plantar fasciitis    Muscle Strength (0-5 scale):  :  4/5       Back/Neck Exam   General Inspection   Gait: normal       Tenderness Right paramedian tenderness of the Occ, Upper C-Spine, Lower L-Spine and SI Joint.Left paramedian tenderness of the Occ, Upper C-Spine, Lower L-Spine and SI Joint.      Comments:  15 out of 18 tender points         Disease Assessment  "Scores:  Patient's Global Assessment of arthritis (0-10): 2  Physician's Global Assessment of arthritis (0-10): 2  Number of Tender Joints (0-28): 2  Number of Swollen Joints (0-28): 2        5/22/2024     8:40 AM   Rapid3 Question Responses and Scores   MDHAQ Score 0   Psychologic Score 7.7   Pain Score 6.5   When you awakened in the morning OVER THE LAST WEEK, did you feel stiff? No   Fatigue Score 0   Global Health Score 2   RAPID3 Score 2.83       Monitoring Lab Results:  Lab Results   Component Value Date    WBC 4.07 10/22/2024    RBC 3.83 (L) 10/22/2024    HGB 9.0 (L) 10/22/2024    HCT 32.2 (L) 10/22/2024    MCV 84 10/22/2024    MCH 23.5 (L) 10/22/2024    MCHC 28.0 (L) 10/22/2024    RDW 14.8 (H) 10/22/2024     10/22/2024        Lab Results   Component Value Date     10/22/2024    K 4.0 10/22/2024     10/22/2024    CO2 26 10/22/2024    GLU 72 10/22/2024    BUN 10 10/22/2024    CREATININE 0.7 10/22/2024    CALCIUM 9.3 10/22/2024    PROT 8.5 (H) 10/22/2024    ALBUMIN 3.9 10/22/2024    BILITOT 0.3 10/22/2024    ALKPHOS 72 10/22/2024    AST 12 10/22/2024    ALT 9 (L) 10/22/2024    ANIONGAP 7 (L) 10/22/2024    EGFRNORACEVR >60.0 10/22/2024       Lab Results   Component Value Date    SEDRATE 36 10/22/2024        Lab Results   Component Value Date    FFGIXUGM42RR 11 (L) 07/13/2023    XDYAKFIF68 >2000 (H) 07/13/2023        Lab Results   Component Value Date    CHOL 138 01/23/2023    HDL 45 01/23/2023    LDLCALC 74 01/23/2023    TRIG 100 01/23/2023       Lab Results   Component Value Date    CCPANTIBODIE 0.7 07/13/2023     Lab Results   Component Value Date    ANASCREEN Positive (A) 10/22/2024    ANATITER 1:160 10/22/2024    DSDNA Negative 1:10 10/22/2024     No results found for: "HLABB27"    Infectious Disease Screening:  Lab Results   Component Value Date    HEPBSAG Non-reactive 10/22/2024    HEPBCAB Non-reactive 10/22/2024    HEPBSAB <3.00 10/22/2024    HEPBSAB Non-reactive 10/22/2024    " "HEPBSURFABQU Negative 10/22/2024    HEPBSURFABQU <3 10/22/2024     Lab Results   Component Value Date    HEPCAB Non-reactive 10/22/2024     Lab Results   Component Value Date    TBGOLDPLUS Negative 10/22/2024     No results found for: "QUANTIFERON", "SVCMT", "QUANTAGVALUE", "QUANTNILVALU", "QUANTMITOGEN", "QFTTBAG", "QINT"     Imaging:  DEXA, Xrays, MRIs, CTs, etc    Old & Outside Medical Records:  Reviewed old and all outside medical records available in Care Everywhere     Assessment:     Encounter Diagnoses   Name Primary?    Systemic lupus erythematosus, unspecified SLE type, unspecified organ involvement status Yes    Chronic pain syndrome     KEMAR (juvenile idiopathic arthritis)     TIA (transient ischemic attack)     Alopecia     Sinusitis, unspecified chronicity, unspecified location     Vitamin D deficiency     Anxiety         Plan:      Encounter Diagnoses   Name Primary?    Systemic lupus erythematosus, unspecified SLE type, unspecified organ involvement status Yes    Chronic pain syndrome     KEMAR (juvenile idiopathic arthritis)     TIA (transient ischemic attack)     Alopecia     Sinusitis, unspecified chronicity, unspecified location     Vitamin D deficiency     Anxiety      Sarahy was seen today for disease management.    Diagnoses and all orders for this visit:    Systemic lupus erythematosus, unspecified SLE type, unspecified organ involvement status  -     Transferrin; Future  -     Iron and TIBC; Future  -     Sedimentation rate; Future  -     Sjogrens syndrome-B extractable nuclear antibody; Future  -     Sjogrens syndrome-A extractable nuclear antibody; Future  -     Comprehensive Metabolic Panel; Future  -     CBC Auto Differential; Future  -     Anti-Smith Antibody; Future  -     Anti-Scleroderma Antibody; Future  -     Anti-Histone Antibody; Future  -     Anti-DNA Ab, Double-Stranded; Future  -     Anti Sm/RNP Antibody; Future  -     WINSOME Screen w/Reflex; Future  -     Urinalysis; Future  -     " Cancel: Urine culture; Future  -     oxyCODONE-acetaminophen (PERCOCET)  mg per tablet; Take 1 tablet by mouth every 8 (eight) hours as needed for Pain.  -     oxyCODONE-acetaminophen (PERCOCET)  mg per tablet; Take 1 tablet by mouth every 8 (eight) hours as needed for Pain.  -     oxyCODONE-acetaminophen (PERCOCET)  mg per tablet; Take 1 tablet by mouth every 8 (eight) hours as needed for Pain.  -     belimumab (BENLYSTA) 200 mg/mL AtIn; Inject 1 mL (200 mg total) into the skin every 7 days.  -     hydroxychloroquine (PLAQUENIL) 200 mg tablet; Take 1 tablet (200 mg total) by mouth 2 (two) times daily.    Chronic pain syndrome  -     Transferrin; Future  -     Iron and TIBC; Future  -     Sedimentation rate; Future  -     Sjogrens syndrome-B extractable nuclear antibody; Future  -     Sjogrens syndrome-A extractable nuclear antibody; Future  -     Comprehensive Metabolic Panel; Future  -     CBC Auto Differential; Future  -     Anti-Smith Antibody; Future  -     Anti-Scleroderma Antibody; Future  -     Anti-Histone Antibody; Future  -     Anti-DNA Ab, Double-Stranded; Future  -     Anti Sm/RNP Antibody; Future  -     WINSOME Screen w/Reflex; Future  -     Urinalysis; Future  -     Cancel: Urine culture; Future  -     oxyCODONE-acetaminophen (PERCOCET)  mg per tablet; Take 1 tablet by mouth every 8 (eight) hours as needed for Pain.  -     oxyCODONE-acetaminophen (PERCOCET)  mg per tablet; Take 1 tablet by mouth every 8 (eight) hours as needed for Pain.  -     oxyCODONE-acetaminophen (PERCOCET)  mg per tablet; Take 1 tablet by mouth every 8 (eight) hours as needed for Pain.  -     hydroxychloroquine (PLAQUENIL) 200 mg tablet; Take 1 tablet (200 mg total) by mouth 2 (two) times daily.    KEMAR (juvenile idiopathic arthritis)  -     Transferrin; Future  -     Iron and TIBC; Future  -     Sedimentation rate; Future  -     Sjogrens syndrome-B extractable nuclear antibody; Future  -      Sjogrens syndrome-A extractable nuclear antibody; Future  -     Comprehensive Metabolic Panel; Future  -     CBC Auto Differential; Future  -     Anti-Smith Antibody; Future  -     Anti-Scleroderma Antibody; Future  -     Anti-Histone Antibody; Future  -     Anti-DNA Ab, Double-Stranded; Future  -     Anti Sm/RNP Antibody; Future  -     WINSOME Screen w/Reflex; Future  -     Urinalysis; Future  -     Cancel: Urine culture; Future  -     oxyCODONE-acetaminophen (PERCOCET)  mg per tablet; Take 1 tablet by mouth every 8 (eight) hours as needed for Pain.  -     oxyCODONE-acetaminophen (PERCOCET)  mg per tablet; Take 1 tablet by mouth every 8 (eight) hours as needed for Pain.  -     oxyCODONE-acetaminophen (PERCOCET)  mg per tablet; Take 1 tablet by mouth every 8 (eight) hours as needed for Pain.    TIA (transient ischemic attack)  -     Transferrin; Future  -     Iron and TIBC; Future  -     Sedimentation rate; Future  -     Sjogrens syndrome-B extractable nuclear antibody; Future  -     Sjogrens syndrome-A extractable nuclear antibody; Future  -     Comprehensive Metabolic Panel; Future  -     CBC Auto Differential; Future  -     Anti-Smith Antibody; Future  -     Anti-Scleroderma Antibody; Future  -     Anti-Histone Antibody; Future  -     Anti-DNA Ab, Double-Stranded; Future  -     Anti Sm/RNP Antibody; Future  -     WINSOME Screen w/Reflex; Future  -     Urinalysis; Future  -     Cancel: Urine culture; Future    Alopecia  -     Transferrin; Future  -     Iron and TIBC; Future  -     Sedimentation rate; Future  -     Sjogrens syndrome-B extractable nuclear antibody; Future  -     Sjogrens syndrome-A extractable nuclear antibody; Future  -     Comprehensive Metabolic Panel; Future  -     CBC Auto Differential; Future  -     Anti-Smith Antibody; Future  -     Anti-Scleroderma Antibody; Future  -     Anti-Histone Antibody; Future  -     Anti-DNA Ab, Double-Stranded; Future  -     Anti Sm/RNP Antibody;  Future  -     WINSOME Screen w/Reflex; Future  -     Urinalysis; Future  -     Cancel: Urine culture; Future  -     Hepatitis C Antibody; Future  -     Quantiferon Gold TB; Future  -     Hepatitis B Surface Antigen; Future  -     Hepatitis B Surface Antibody, Qual/Quant; Future  -     Hepatitis B Core Antibody, Total; Future  -     Hepatitis B Surface Ab, Qualitative; Future  -     ergocalciferol (ERGOCALCIFEROL) 50,000 unit Cap; Take 1 capsule (50,000 Units total) by mouth every 7 days.  -     hydroxychloroquine (PLAQUENIL) 200 mg tablet; Take 1 tablet (200 mg total) by mouth 2 (two) times daily.  -     finasteride (PROPECIA) 1 mg tablet; Take 1 tablet (1 mg total) by mouth once daily.    Sinusitis, unspecified chronicity, unspecified location  -     azithromycin (Z-ROCIO) 250 MG tablet; Take 2 tablets by mouth on day 1; Take 1 tablet by mouth on days 2-5    Vitamin D deficiency  -     ergocalciferol (ERGOCALCIFEROL) 50,000 unit Cap; Take 1 capsule (50,000 Units total) by mouth every 7 days.    Anxiety  -     busPIRone (BUSPAR) 10 MG tablet; Take 1 tablet (10 mg total) by mouth 2 (two) times daily.       1. Check labs  2. Refill perocet  3. Continue plaquenil     Follow-up 4 months    More than 50% of the  31 minute encounter was spent face to face counseling the patient regarding current status and future plan of care as well as side effects  of the medications. All questions were answered to patient's satisfaction also includes  non-face to face time preparing to see the patient (eg, review of tests), Obtaining and/or reviewing separately obtained history, Documenting clinical information in the electronic or other health record, Independently interpreting results

## 2024-10-23 LAB
ANA PATTERN 1: NORMAL
ANA PATTERN 2: NORMAL
ANA SER QL IF: POSITIVE
ANA TITER 2: NORMAL
ANA TITR SER IF: NORMAL {TITER}
DNA TITER: 0
DSDNA AB SER-ACNC: NORMAL [IU]/ML

## 2024-10-24 ENCOUNTER — PATIENT MESSAGE (OUTPATIENT)
Dept: GASTROENTEROLOGY | Facility: CLINIC | Age: 29
End: 2024-10-24
Payer: MEDICAID

## 2024-10-24 LAB
ANTI SM ANTIBODY: 0.11 RATIO (ref 0–0.99)
ANTI SM/RNP ANTIBODY: 0.11 RATIO (ref 0–0.99)
ANTI-SM INTERPRETATION: NEGATIVE
ANTI-SM/RNP INTERPRETATION: NEGATIVE
ANTI-SSA ANTIBODY: 0.15 RATIO (ref 0–0.99)
ANTI-SSA INTERPRETATION: NEGATIVE
ANTI-SSB ANTIBODY: 0.09 RATIO (ref 0–0.99)
ANTI-SSB INTERPRETATION: NEGATIVE
GAMMA INTERFERON BACKGROUND BLD IA-ACNC: 0.03 IU/ML
M TB IFN-G CD4+ BCKGRND COR BLD-ACNC: 0.03 IU/ML
M TB IFN-G CD4+ BCKGRND COR BLD-ACNC: 0.03 IU/ML
MITOGEN IGNF BCKGRD COR BLD-ACNC: 9.97 IU/ML
TB GOLD PLUS: NEGATIVE

## 2024-10-25 LAB — HISTONE IGG SER IA-ACNC: 0.6 UNITS (ref 0–0.9)

## 2024-10-27 LAB
HBV SURFACE AB SER QL IA: NEGATIVE
HBV SURFACE AB SERPL IA-ACNC: <3 MIU/ML

## 2024-10-28 LAB — ENA SCL70 AB SER-ACNC: <0.6 U/ML

## 2024-11-04 ENCOUNTER — TELEPHONE (OUTPATIENT)
Dept: HEMATOLOGY/ONCOLOGY | Facility: CLINIC | Age: 29
End: 2024-11-04
Payer: MEDICAID

## 2024-11-04 NOTE — TELEPHONE ENCOUNTER
Reviewed heme referral from [facility] from Dr Song, Rheum.     LVM for pt to call back to schedule benign heme appt.  This BH appt should be scheduled with a MD with other commodities.

## 2024-11-13 ENCOUNTER — TELEPHONE (OUTPATIENT)
Dept: HEMATOLOGY/ONCOLOGY | Facility: CLINIC | Age: 29
End: 2024-11-13
Payer: MEDICAID

## 2024-11-19 ENCOUNTER — TELEPHONE (OUTPATIENT)
Dept: HEMATOLOGY/ONCOLOGY | Facility: CLINIC | Age: 29
End: 2024-11-19
Payer: MEDICAID

## 2024-11-19 NOTE — TELEPHONE ENCOUNTER
Hematology referral in [facility].   Spoke with pt and scheduled first available appt. Pt accepted appt and VU of date/time/location.

## 2024-12-03 DIAGNOSIS — E55.9 VITAMIN D DEFICIENCY: ICD-10-CM

## 2024-12-03 DIAGNOSIS — L65.9 ALOPECIA: ICD-10-CM

## 2024-12-06 ENCOUNTER — PATIENT MESSAGE (OUTPATIENT)
Dept: RHEUMATOLOGY | Facility: CLINIC | Age: 29
End: 2024-12-06
Payer: MEDICAID

## 2024-12-06 RX ORDER — ERGOCALCIFEROL 1.25 MG/1
50000 CAPSULE ORAL
Qty: 4 CAPSULE | Refills: 6 | OUTPATIENT
Start: 2024-12-06

## 2025-01-23 DIAGNOSIS — L30.9 ECZEMA, UNSPECIFIED TYPE: ICD-10-CM

## 2025-01-23 DIAGNOSIS — T78.40XS ALLERGY, SEQUELA: ICD-10-CM

## 2025-01-23 DIAGNOSIS — E55.9 VITAMIN D DEFICIENCY: ICD-10-CM

## 2025-01-23 DIAGNOSIS — G89.4 CHRONIC PAIN SYNDROME: ICD-10-CM

## 2025-01-23 DIAGNOSIS — M08.80 JIA (JUVENILE IDIOPATHIC ARTHRITIS): ICD-10-CM

## 2025-01-23 DIAGNOSIS — M32.9 SYSTEMIC LUPUS ERYTHEMATOSUS, UNSPECIFIED SLE TYPE, UNSPECIFIED ORGAN INVOLVEMENT STATUS: ICD-10-CM

## 2025-01-23 DIAGNOSIS — L65.9 ALOPECIA: ICD-10-CM

## 2025-01-23 DIAGNOSIS — J32.9 SINUSITIS, UNSPECIFIED CHRONICITY, UNSPECIFIED LOCATION: ICD-10-CM

## 2025-01-23 DIAGNOSIS — F41.9 ANXIETY: ICD-10-CM

## 2025-01-27 RX ORDER — HYDROXYCHLOROQUINE SULFATE 200 MG/1
200 TABLET, FILM COATED ORAL 2 TIMES DAILY
Qty: 60 TABLET | Refills: 5 | Status: SHIPPED | OUTPATIENT
Start: 2025-01-27 | End: 2025-07-26

## 2025-01-27 RX ORDER — FINASTERIDE 1 MG/1
1 TABLET, FILM COATED ORAL DAILY
Qty: 30 TABLET | Refills: 6 | Status: SHIPPED | OUTPATIENT
Start: 2025-01-27 | End: 2025-08-25

## 2025-01-27 RX ORDER — OXYCODONE AND ACETAMINOPHEN 10; 325 MG/1; MG/1
1 TABLET ORAL EVERY 8 HOURS PRN
Qty: 90 TABLET | Refills: 0 | Status: SHIPPED | OUTPATIENT
Start: 2025-01-27 | End: 2025-02-26

## 2025-01-27 RX ORDER — LEVOCETIRIZINE DIHYDROCHLORIDE 5 MG/1
5 TABLET, FILM COATED ORAL NIGHTLY
Qty: 30 TABLET | Refills: 11 | Status: SHIPPED | OUTPATIENT
Start: 2025-01-27 | End: 2026-01-27

## 2025-01-27 RX ORDER — AZITHROMYCIN 250 MG/1
TABLET, FILM COATED ORAL
Qty: 6 TABLET | Refills: 0 | Status: SHIPPED | OUTPATIENT
Start: 2025-01-27

## 2025-01-27 RX ORDER — MINOXIDIL 2.5 MG/1
2.5 TABLET ORAL DAILY
Qty: 30 TABLET | Refills: 11 | Status: SHIPPED | OUTPATIENT
Start: 2025-01-27 | End: 2026-01-27

## 2025-01-27 RX ORDER — BUSPIRONE HYDROCHLORIDE 10 MG/1
10 TABLET ORAL 2 TIMES DAILY
Qty: 60 TABLET | Refills: 3 | Status: SHIPPED | OUTPATIENT
Start: 2025-01-27 | End: 2026-01-27

## 2025-01-27 RX ORDER — ERGOCALCIFEROL 1.25 MG/1
50000 CAPSULE ORAL
Qty: 4 CAPSULE | Refills: 6 | Status: SHIPPED | OUTPATIENT
Start: 2025-01-27

## 2025-01-29 ENCOUNTER — PATIENT MESSAGE (OUTPATIENT)
Dept: RHEUMATOLOGY | Facility: CLINIC | Age: 30
End: 2025-01-29
Payer: MEDICAID

## 2025-01-30 RX ORDER — TRIAMCINOLONE ACETONIDE 1 MG/G
OINTMENT TOPICAL 2 TIMES DAILY
Qty: 80 G | Refills: 1 | Status: SHIPPED | OUTPATIENT
Start: 2025-01-30 | End: 2025-07-29

## 2025-01-30 RX ORDER — ONDANSETRON 4 MG/1
4 TABLET, ORALLY DISINTEGRATING ORAL EVERY 8 HOURS PRN
Qty: 40 TABLET | Refills: 1 | Status: SHIPPED | OUTPATIENT
Start: 2025-01-30 | End: 2025-07-29

## 2025-02-21 DIAGNOSIS — F41.9 ANXIETY: ICD-10-CM

## 2025-02-21 DIAGNOSIS — M32.9 SYSTEMIC LUPUS ERYTHEMATOSUS, UNSPECIFIED SLE TYPE, UNSPECIFIED ORGAN INVOLVEMENT STATUS: ICD-10-CM

## 2025-02-21 DIAGNOSIS — L30.9 ECZEMA, UNSPECIFIED TYPE: ICD-10-CM

## 2025-02-21 DIAGNOSIS — M08.80 JIA (JUVENILE IDIOPATHIC ARTHRITIS): ICD-10-CM

## 2025-02-21 DIAGNOSIS — L65.9 ALOPECIA: ICD-10-CM

## 2025-02-21 DIAGNOSIS — G89.4 CHRONIC PAIN SYNDROME: ICD-10-CM

## 2025-02-21 DIAGNOSIS — T78.40XS ALLERGY, SEQUELA: ICD-10-CM

## 2025-02-21 DIAGNOSIS — E55.9 VITAMIN D DEFICIENCY: ICD-10-CM

## 2025-02-23 RX ORDER — HYDROXYCHLOROQUINE SULFATE 200 MG/1
200 TABLET, FILM COATED ORAL 2 TIMES DAILY
Qty: 60 TABLET | Refills: 5 | Status: SHIPPED | OUTPATIENT
Start: 2025-02-23 | End: 2025-08-22

## 2025-02-23 RX ORDER — MINOXIDIL 2.5 MG/1
2.5 TABLET ORAL DAILY
Qty: 30 TABLET | Refills: 11 | Status: SHIPPED | OUTPATIENT
Start: 2025-02-23 | End: 2026-02-23

## 2025-02-23 RX ORDER — LEVOCETIRIZINE DIHYDROCHLORIDE 5 MG/1
5 TABLET, FILM COATED ORAL NIGHTLY
Qty: 30 TABLET | Refills: 11 | Status: SHIPPED | OUTPATIENT
Start: 2025-02-23 | End: 2026-02-23

## 2025-02-23 RX ORDER — ERGOCALCIFEROL 1.25 MG/1
50000 CAPSULE ORAL
Qty: 4 CAPSULE | Refills: 6 | Status: SHIPPED | OUTPATIENT
Start: 2025-02-23

## 2025-02-23 RX ORDER — FINASTERIDE 1 MG/1
1 TABLET, FILM COATED ORAL DAILY
Qty: 30 TABLET | Refills: 6 | Status: SHIPPED | OUTPATIENT
Start: 2025-02-23 | End: 2025-09-21

## 2025-02-23 RX ORDER — BUSPIRONE HYDROCHLORIDE 10 MG/1
10 TABLET ORAL 2 TIMES DAILY
Qty: 60 TABLET | Refills: 3 | Status: SHIPPED | OUTPATIENT
Start: 2025-02-23 | End: 2026-02-23

## 2025-02-23 RX ORDER — OXYCODONE AND ACETAMINOPHEN 10; 325 MG/1; MG/1
1 TABLET ORAL EVERY 8 HOURS PRN
Qty: 90 TABLET | Refills: 0 | Status: SHIPPED | OUTPATIENT
Start: 2025-02-23 | End: 2025-03-25

## 2025-02-24 RX ORDER — ONDANSETRON 4 MG/1
4 TABLET, ORALLY DISINTEGRATING ORAL EVERY 8 HOURS PRN
Qty: 40 TABLET | Refills: 1 | Status: SHIPPED | OUTPATIENT
Start: 2025-02-24 | End: 2025-08-23

## 2025-02-24 RX ORDER — TRIAMCINOLONE ACETONIDE 1 MG/G
OINTMENT TOPICAL 2 TIMES DAILY
Qty: 80 G | Refills: 1 | Status: SHIPPED | OUTPATIENT
Start: 2025-02-24 | End: 2025-08-23

## 2025-03-22 DIAGNOSIS — M32.9 SYSTEMIC LUPUS ERYTHEMATOSUS, UNSPECIFIED SLE TYPE, UNSPECIFIED ORGAN INVOLVEMENT STATUS: ICD-10-CM

## 2025-03-22 DIAGNOSIS — T78.40XS ALLERGY, SEQUELA: ICD-10-CM

## 2025-03-22 DIAGNOSIS — F41.9 ANXIETY: ICD-10-CM

## 2025-03-22 DIAGNOSIS — M08.80 JIA (JUVENILE IDIOPATHIC ARTHRITIS): ICD-10-CM

## 2025-03-22 DIAGNOSIS — L30.9 ECZEMA, UNSPECIFIED TYPE: ICD-10-CM

## 2025-03-22 DIAGNOSIS — E55.9 VITAMIN D DEFICIENCY: ICD-10-CM

## 2025-03-22 DIAGNOSIS — G89.4 CHRONIC PAIN SYNDROME: ICD-10-CM

## 2025-03-22 DIAGNOSIS — L65.9 ALOPECIA: ICD-10-CM

## 2025-03-22 DIAGNOSIS — J32.9 SINUSITIS, UNSPECIFIED CHRONICITY, UNSPECIFIED LOCATION: ICD-10-CM

## 2025-03-24 ENCOUNTER — PATIENT MESSAGE (OUTPATIENT)
Dept: RHEUMATOLOGY | Facility: CLINIC | Age: 30
End: 2025-03-24
Payer: MEDICAID

## 2025-03-25 RX ORDER — OXYCODONE AND ACETAMINOPHEN 10; 325 MG/1; MG/1
1 TABLET ORAL EVERY 8 HOURS PRN
Qty: 90 TABLET | Refills: 0 | Status: SHIPPED | OUTPATIENT
Start: 2025-04-01 | End: 2025-05-01

## 2025-03-25 RX ORDER — LEVOCETIRIZINE DIHYDROCHLORIDE 5 MG/1
5 TABLET, FILM COATED ORAL NIGHTLY
Qty: 30 TABLET | Refills: 11 | Status: SHIPPED | OUTPATIENT
Start: 2025-03-25 | End: 2026-03-25

## 2025-03-25 RX ORDER — ONDANSETRON 4 MG/1
4 TABLET, ORALLY DISINTEGRATING ORAL EVERY 8 HOURS PRN
Qty: 40 TABLET | Refills: 1 | Status: SHIPPED | OUTPATIENT
Start: 2025-03-25 | End: 2025-09-21

## 2025-03-25 RX ORDER — HYDROXYCHLOROQUINE SULFATE 200 MG/1
200 TABLET, FILM COATED ORAL 2 TIMES DAILY
Qty: 60 TABLET | Refills: 5 | Status: SHIPPED | OUTPATIENT
Start: 2025-03-25 | End: 2025-09-21

## 2025-03-25 RX ORDER — TRIAMCINOLONE ACETONIDE 1 MG/G
OINTMENT TOPICAL 2 TIMES DAILY
Qty: 80 G | Refills: 1 | Status: SHIPPED | OUTPATIENT
Start: 2025-03-25 | End: 2025-09-21

## 2025-03-25 RX ORDER — AZITHROMYCIN 250 MG/1
TABLET, FILM COATED ORAL
Qty: 6 TABLET | Refills: 0 | Status: SHIPPED | OUTPATIENT
Start: 2025-03-25

## 2025-03-25 RX ORDER — ERGOCALCIFEROL 1.25 MG/1
50000 CAPSULE ORAL
Qty: 4 CAPSULE | Refills: 6 | Status: SHIPPED | OUTPATIENT
Start: 2025-03-25

## 2025-03-25 RX ORDER — MINOXIDIL 2.5 MG/1
2.5 TABLET ORAL DAILY
Qty: 30 TABLET | Refills: 11 | Status: SHIPPED | OUTPATIENT
Start: 2025-03-25 | End: 2026-03-25

## 2025-03-25 RX ORDER — BUSPIRONE HYDROCHLORIDE 10 MG/1
10 TABLET ORAL 2 TIMES DAILY
Qty: 60 TABLET | Refills: 3 | Status: SHIPPED | OUTPATIENT
Start: 2025-03-25 | End: 2026-03-25

## 2025-04-21 DIAGNOSIS — M32.9 SYSTEMIC LUPUS ERYTHEMATOSUS, UNSPECIFIED SLE TYPE, UNSPECIFIED ORGAN INVOLVEMENT STATUS: ICD-10-CM

## 2025-04-21 DIAGNOSIS — M08.80 JIA (JUVENILE IDIOPATHIC ARTHRITIS): ICD-10-CM

## 2025-04-21 DIAGNOSIS — G89.4 CHRONIC PAIN SYNDROME: ICD-10-CM

## 2025-04-21 DIAGNOSIS — T78.40XS ALLERGY, SEQUELA: ICD-10-CM

## 2025-04-21 DIAGNOSIS — L65.9 ALOPECIA: ICD-10-CM

## 2025-04-21 DIAGNOSIS — L30.9 ECZEMA, UNSPECIFIED TYPE: ICD-10-CM

## 2025-04-21 DIAGNOSIS — E55.9 VITAMIN D DEFICIENCY: ICD-10-CM

## 2025-04-21 DIAGNOSIS — J32.9 SINUSITIS, UNSPECIFIED CHRONICITY, UNSPECIFIED LOCATION: ICD-10-CM

## 2025-04-21 DIAGNOSIS — F41.9 ANXIETY: ICD-10-CM

## 2025-04-22 RX ORDER — OXYCODONE AND ACETAMINOPHEN 10; 325 MG/1; MG/1
1 TABLET ORAL EVERY 8 HOURS PRN
Qty: 90 TABLET | Refills: 0 | Status: SHIPPED | OUTPATIENT
Start: 2025-04-22 | End: 2025-05-22

## 2025-04-22 RX ORDER — ERGOCALCIFEROL 1.25 MG/1
50000 CAPSULE ORAL
Qty: 4 CAPSULE | Refills: 6 | Status: SHIPPED | OUTPATIENT
Start: 2025-04-22

## 2025-04-22 RX ORDER — ONDANSETRON 4 MG/1
4 TABLET, ORALLY DISINTEGRATING ORAL EVERY 8 HOURS PRN
Qty: 40 TABLET | Refills: 1 | Status: SHIPPED | OUTPATIENT
Start: 2025-04-22 | End: 2025-10-19

## 2025-04-22 RX ORDER — TRIAMCINOLONE ACETONIDE 1 MG/G
OINTMENT TOPICAL 2 TIMES DAILY
Qty: 80 G | Refills: 1 | Status: SHIPPED | OUTPATIENT
Start: 2025-04-22 | End: 2025-04-25 | Stop reason: SDUPTHER

## 2025-04-22 RX ORDER — HYDROXYCHLOROQUINE SULFATE 200 MG/1
200 TABLET, FILM COATED ORAL 2 TIMES DAILY
Qty: 60 TABLET | Refills: 5 | Status: SHIPPED | OUTPATIENT
Start: 2025-04-22 | End: 2025-10-19

## 2025-04-22 RX ORDER — AZITHROMYCIN 250 MG/1
TABLET, FILM COATED ORAL
Qty: 6 TABLET | Refills: 0 | Status: SHIPPED | OUTPATIENT
Start: 2025-04-22

## 2025-04-22 RX ORDER — MINOXIDIL 2.5 MG/1
2.5 TABLET ORAL DAILY
Qty: 30 TABLET | Refills: 11 | Status: SHIPPED | OUTPATIENT
Start: 2025-04-22 | End: 2026-04-22

## 2025-04-22 RX ORDER — LEVOCETIRIZINE DIHYDROCHLORIDE 5 MG/1
5 TABLET, FILM COATED ORAL NIGHTLY
Qty: 30 TABLET | Refills: 11 | Status: SHIPPED | OUTPATIENT
Start: 2025-04-22 | End: 2026-04-22

## 2025-04-22 RX ORDER — BUSPIRONE HYDROCHLORIDE 10 MG/1
10 TABLET ORAL 2 TIMES DAILY
Qty: 60 TABLET | Refills: 3 | Status: SHIPPED | OUTPATIENT
Start: 2025-04-22 | End: 2026-04-22

## 2025-04-25 DIAGNOSIS — M32.9 SYSTEMIC LUPUS ERYTHEMATOSUS, UNSPECIFIED SLE TYPE, UNSPECIFIED ORGAN INVOLVEMENT STATUS: ICD-10-CM

## 2025-04-25 DIAGNOSIS — L30.9 ECZEMA, UNSPECIFIED TYPE: ICD-10-CM

## 2025-04-25 RX ORDER — TRIAMCINOLONE ACETONIDE 1 MG/G
OINTMENT TOPICAL 2 TIMES DAILY
Qty: 80 G | Refills: 1 | Status: SHIPPED | OUTPATIENT
Start: 2025-04-25 | End: 2025-10-22

## 2025-05-01 ENCOUNTER — PATIENT MESSAGE (OUTPATIENT)
Dept: RHEUMATOLOGY | Facility: CLINIC | Age: 30
End: 2025-05-01
Payer: MEDICAID

## 2025-05-26 DIAGNOSIS — G89.4 CHRONIC PAIN SYNDROME: ICD-10-CM

## 2025-05-26 DIAGNOSIS — M08.80 JIA (JUVENILE IDIOPATHIC ARTHRITIS): ICD-10-CM

## 2025-05-26 DIAGNOSIS — M32.9 SYSTEMIC LUPUS ERYTHEMATOSUS, UNSPECIFIED SLE TYPE, UNSPECIFIED ORGAN INVOLVEMENT STATUS: ICD-10-CM

## 2025-05-26 RX ORDER — OXYCODONE AND ACETAMINOPHEN 10; 325 MG/1; MG/1
1 TABLET ORAL EVERY 8 HOURS PRN
Qty: 90 TABLET | Refills: 0 | Status: SHIPPED | OUTPATIENT
Start: 2025-05-26 | End: 2025-06-25

## 2025-06-23 ENCOUNTER — LAB VISIT (OUTPATIENT)
Dept: LAB | Facility: HOSPITAL | Age: 30
End: 2025-06-23
Attending: INTERNAL MEDICINE
Payer: MEDICAID

## 2025-06-23 ENCOUNTER — OFFICE VISIT (OUTPATIENT)
Dept: RHEUMATOLOGY | Facility: CLINIC | Age: 30
End: 2025-06-23
Payer: MEDICAID

## 2025-06-23 VITALS
SYSTOLIC BLOOD PRESSURE: 131 MMHG | HEART RATE: 63 BPM | WEIGHT: 145.5 LBS | HEIGHT: 62 IN | BODY MASS INDEX: 26.78 KG/M2 | DIASTOLIC BLOOD PRESSURE: 82 MMHG

## 2025-06-23 DIAGNOSIS — Z90.3 H/O GASTRIC SLEEVE: ICD-10-CM

## 2025-06-23 DIAGNOSIS — L65.9 ALOPECIA: ICD-10-CM

## 2025-06-23 DIAGNOSIS — E55.9 VITAMIN D DEFICIENCY: ICD-10-CM

## 2025-06-23 DIAGNOSIS — M08.80 JIA (JUVENILE IDIOPATHIC ARTHRITIS): ICD-10-CM

## 2025-06-23 DIAGNOSIS — M32.9 SYSTEMIC LUPUS ERYTHEMATOSUS, UNSPECIFIED SLE TYPE, UNSPECIFIED ORGAN INVOLVEMENT STATUS: ICD-10-CM

## 2025-06-23 DIAGNOSIS — I10 HYPERTENSION, UNSPECIFIED TYPE: ICD-10-CM

## 2025-06-23 DIAGNOSIS — D64.9 ANEMIA, UNSPECIFIED TYPE: ICD-10-CM

## 2025-06-23 DIAGNOSIS — Z00.00 ANNUAL PHYSICAL EXAM: ICD-10-CM

## 2025-06-23 DIAGNOSIS — M32.9 SYSTEMIC LUPUS ERYTHEMATOSUS, UNSPECIFIED SLE TYPE, UNSPECIFIED ORGAN INVOLVEMENT STATUS: Primary | ICD-10-CM

## 2025-06-23 DIAGNOSIS — G89.4 CHRONIC PAIN SYNDROME: ICD-10-CM

## 2025-06-23 DIAGNOSIS — T78.40XS ALLERGY, SEQUELA: ICD-10-CM

## 2025-06-23 DIAGNOSIS — D50.9 IRON DEFICIENCY ANEMIA, UNSPECIFIED IRON DEFICIENCY ANEMIA TYPE: ICD-10-CM

## 2025-06-23 DIAGNOSIS — L30.9 ECZEMA, UNSPECIFIED TYPE: ICD-10-CM

## 2025-06-23 DIAGNOSIS — F41.9 ANXIETY: ICD-10-CM

## 2025-06-23 LAB
25(OH)D3+25(OH)D2 SERPL-MCNC: 17 NG/ML (ref 30–96)
ABSOLUTE EOSINOPHIL (OHS): 0.08 K/UL
ABSOLUTE MONOCYTE (OHS): 0.21 K/UL (ref 0.3–1)
ABSOLUTE NEUTROPHIL COUNT (OHS): 1.9 K/UL (ref 1.8–7.7)
ALBUMIN SERPL BCP-MCNC: 4.4 G/DL (ref 3.5–5.2)
ALP SERPL-CCNC: 66 UNIT/L (ref 40–150)
ALT SERPL W/O P-5'-P-CCNC: 7 UNIT/L (ref 10–44)
ANION GAP (OHS): 15 MMOL/L (ref 8–16)
AST SERPL-CCNC: 14 UNIT/L (ref 11–45)
BASOPHILS # BLD AUTO: 0.02 K/UL
BASOPHILS NFR BLD AUTO: 0.5 %
BILIRUB SERPL-MCNC: 0.5 MG/DL (ref 0.1–1)
BUN SERPL-MCNC: 12 MG/DL (ref 6–20)
CALCIUM SERPL-MCNC: 9.6 MG/DL (ref 8.7–10.5)
CHLORIDE SERPL-SCNC: 109 MMOL/L (ref 95–110)
CO2 SERPL-SCNC: 23 MMOL/L (ref 23–29)
CREAT SERPL-MCNC: 0.7 MG/DL (ref 0.5–1.4)
CRP SERPL-MCNC: 0.7 MG/L
ERYTHROCYTE [DISTWIDTH] IN BLOOD BY AUTOMATED COUNT: 15.9 % (ref 11.5–14.5)
ERYTHROCYTE [SEDIMENTATION RATE] IN BLOOD BY PHOTOMETRIC METHOD: 63 MM/HR
FERRITIN SERPL-MCNC: 7 NG/ML (ref 20–300)
FOLATE SERPL-MCNC: 10.2 NG/ML (ref 4–24)
GFR SERPLBLD CREATININE-BSD FMLA CKD-EPI: >60 ML/MIN/1.73/M2
GLUCOSE SERPL-MCNC: 79 MG/DL (ref 70–110)
HCT VFR BLD AUTO: 30.7 % (ref 37–48.5)
HGB BLD-MCNC: 7.9 GM/DL (ref 12–16)
HIV 1+2 AB+HIV1 P24 AG SERPL QL IA: NORMAL
IMM GRANULOCYTES # BLD AUTO: 0 K/UL (ref 0–0.04)
IMM GRANULOCYTES NFR BLD AUTO: 0 % (ref 0–0.5)
IRON SATN MFR SERPL: 3 % (ref 20–50)
IRON SERPL-MCNC: 15 UG/DL (ref 30–160)
LYMPHOCYTES # BLD AUTO: 1.93 K/UL (ref 1–4.8)
MAGNESIUM SERPL-MCNC: 2.1 MG/DL (ref 1.6–2.6)
MCH RBC QN AUTO: 21.1 PG (ref 27–31)
MCHC RBC AUTO-ENTMCNC: 25.7 G/DL (ref 32–36)
MCV RBC AUTO: 82 FL (ref 82–98)
NUCLEATED RBC (/100WBC) (OHS): 0 /100 WBC
PHOSPHATE SERPL-MCNC: 3.7 MG/DL (ref 2.7–4.5)
PLATELET # BLD AUTO: 285 K/UL (ref 150–450)
PMV BLD AUTO: 11.8 FL (ref 9.2–12.9)
POTASSIUM SERPL-SCNC: 3.5 MMOL/L (ref 3.5–5.1)
PROT SERPL-MCNC: 8.9 GM/DL (ref 6–8.4)
RBC # BLD AUTO: 3.75 M/UL (ref 4–5.4)
RELATIVE EOSINOPHIL (OHS): 1.9 %
RELATIVE LYMPHOCYTE (OHS): 46.6 % (ref 18–48)
RELATIVE MONOCYTE (OHS): 5.1 % (ref 4–15)
RELATIVE NEUTROPHIL (OHS): 45.9 % (ref 38–73)
SODIUM SERPL-SCNC: 147 MMOL/L (ref 136–145)
T PALLIDUM IGG+IGM SER QL: NORMAL
TIBC SERPL-MCNC: 502 UG/DL (ref 250–450)
TRANSFERRIN SERPL-MCNC: 339 MG/DL (ref 200–375)
VIT B12 SERPL-MCNC: 293 PG/ML (ref 210–950)
WBC # BLD AUTO: 4.14 K/UL (ref 3.9–12.7)

## 2025-06-23 PROCEDURE — 83735 ASSAY OF MAGNESIUM: CPT

## 2025-06-23 PROCEDURE — 3075F SYST BP GE 130 - 139MM HG: CPT | Mod: CPTII,,, | Performed by: INTERNAL MEDICINE

## 2025-06-23 PROCEDURE — 80053 COMPREHEN METABOLIC PANEL: CPT

## 2025-06-23 PROCEDURE — 82728 ASSAY OF FERRITIN: CPT

## 2025-06-23 PROCEDURE — 86593 SYPHILIS TEST NON-TREP QUANT: CPT

## 2025-06-23 PROCEDURE — 82746 ASSAY OF FOLIC ACID SERUM: CPT

## 2025-06-23 PROCEDURE — 86140 C-REACTIVE PROTEIN: CPT

## 2025-06-23 PROCEDURE — 84207 ASSAY OF VITAMIN B-6: CPT

## 2025-06-23 PROCEDURE — 1160F RVW MEDS BY RX/DR IN RCRD: CPT | Mod: CPTII,,, | Performed by: INTERNAL MEDICINE

## 2025-06-23 PROCEDURE — 82306 VITAMIN D 25 HYDROXY: CPT

## 2025-06-23 PROCEDURE — 87389 HIV-1 AG W/HIV-1&-2 AB AG IA: CPT

## 2025-06-23 PROCEDURE — 99213 OFFICE O/P EST LOW 20 MIN: CPT | Mod: PBBFAC,PO | Performed by: INTERNAL MEDICINE

## 2025-06-23 PROCEDURE — 84630 ASSAY OF ZINC: CPT

## 2025-06-23 PROCEDURE — 3008F BODY MASS INDEX DOCD: CPT | Mod: CPTII,,, | Performed by: INTERNAL MEDICINE

## 2025-06-23 PROCEDURE — 99999 PR PBB SHADOW E&M-EST. PATIENT-LVL III: CPT | Mod: PBBFAC,,, | Performed by: INTERNAL MEDICINE

## 2025-06-23 PROCEDURE — 99215 OFFICE O/P EST HI 40 MIN: CPT | Mod: S$PBB,,, | Performed by: INTERNAL MEDICINE

## 2025-06-23 PROCEDURE — 1159F MED LIST DOCD IN RCRD: CPT | Mod: CPTII,,, | Performed by: INTERNAL MEDICINE

## 2025-06-23 PROCEDURE — 84100 ASSAY OF PHOSPHORUS: CPT

## 2025-06-23 PROCEDURE — 85652 RBC SED RATE AUTOMATED: CPT

## 2025-06-23 PROCEDURE — 83970 ASSAY OF PARATHORMONE: CPT

## 2025-06-23 PROCEDURE — 82607 VITAMIN B-12: CPT

## 2025-06-23 PROCEDURE — 83540 ASSAY OF IRON: CPT

## 2025-06-23 PROCEDURE — 84425 ASSAY OF VITAMIN B-1: CPT

## 2025-06-23 PROCEDURE — 3079F DIAST BP 80-89 MM HG: CPT | Mod: CPTII,,, | Performed by: INTERNAL MEDICINE

## 2025-06-23 PROCEDURE — 36415 COLL VENOUS BLD VENIPUNCTURE: CPT | Mod: PO

## 2025-06-23 PROCEDURE — 85025 COMPLETE CBC W/AUTO DIFF WBC: CPT

## 2025-06-23 RX ORDER — OXYCODONE AND ACETAMINOPHEN 10; 325 MG/1; MG/1
1 TABLET ORAL EVERY 8 HOURS PRN
Qty: 90 TABLET | Refills: 0 | Status: SHIPPED | OUTPATIENT
Start: 2025-06-23 | End: 2025-07-23

## 2025-06-23 RX ORDER — OXYCODONE AND ACETAMINOPHEN 10; 325 MG/1; MG/1
1 TABLET ORAL EVERY 8 HOURS PRN
Qty: 90 TABLET | Refills: 0 | Status: SHIPPED | OUTPATIENT
Start: 2025-07-21 | End: 2025-08-20

## 2025-06-23 RX ORDER — LEVOCETIRIZINE DIHYDROCHLORIDE 5 MG/1
5 TABLET, FILM COATED ORAL NIGHTLY
Qty: 30 TABLET | Refills: 11 | Status: SHIPPED | OUTPATIENT
Start: 2025-06-23

## 2025-06-23 RX ORDER — ONDANSETRON 4 MG/1
4 TABLET, ORALLY DISINTEGRATING ORAL EVERY 8 HOURS PRN
Qty: 40 TABLET | Refills: 6 | Status: SHIPPED | OUTPATIENT
Start: 2025-06-23

## 2025-06-23 RX ORDER — BUSPIRONE HYDROCHLORIDE 10 MG/1
10 TABLET ORAL 2 TIMES DAILY
Qty: 60 TABLET | Refills: 12 | Status: SHIPPED | OUTPATIENT
Start: 2025-06-23

## 2025-06-23 RX ORDER — BELIMUMAB 200 MG/ML
200 SOLUTION SUBCUTANEOUS
Qty: 4 ML | Refills: 11 | Status: ACTIVE | OUTPATIENT
Start: 2025-06-23

## 2025-06-23 RX ORDER — TRIAMCINOLONE ACETONIDE 1 MG/G
OINTMENT TOPICAL 2 TIMES DAILY
Qty: 80 G | Refills: 6 | Status: SHIPPED | OUTPATIENT
Start: 2025-06-23

## 2025-06-23 RX ORDER — HYDROXYCHLOROQUINE SULFATE 200 MG/1
200 TABLET, FILM COATED ORAL 2 TIMES DAILY
Qty: 60 TABLET | Refills: 5 | Status: SHIPPED | OUTPATIENT
Start: 2025-06-23 | End: 2025-12-20

## 2025-06-23 RX ORDER — ERGOCALCIFEROL 1.25 MG/1
50000 CAPSULE ORAL
Qty: 30 CAPSULE | Refills: 1 | Status: SHIPPED | OUTPATIENT
Start: 2025-06-23

## 2025-06-23 RX ORDER — FINASTERIDE 5 MG/1
5 TABLET, FILM COATED ORAL DAILY
Qty: 30 TABLET | Refills: 11 | Status: SHIPPED | OUTPATIENT
Start: 2025-06-23 | End: 2026-06-23

## 2025-06-23 RX ORDER — OXYCODONE AND ACETAMINOPHEN 10; 325 MG/1; MG/1
1 TABLET ORAL EVERY 8 HOURS PRN
Qty: 90 TABLET | Refills: 0 | Status: CANCELLED | OUTPATIENT
Start: 2025-06-23 | End: 2025-07-23

## 2025-06-23 RX ORDER — OXYCODONE AND ACETAMINOPHEN 10; 325 MG/1; MG/1
1 TABLET ORAL EVERY 8 HOURS PRN
Qty: 90 TABLET | Refills: 0 | Status: SHIPPED | OUTPATIENT
Start: 2025-08-20 | End: 2025-09-19

## 2025-06-23 RX ORDER — MINOXIDIL 2.5 MG/1
2.5 TABLET ORAL DAILY
Qty: 30 TABLET | Refills: 11 | Status: SHIPPED | OUTPATIENT
Start: 2025-06-23

## 2025-06-23 NOTE — PROGRESS NOTES
Subjective:     Patient ID:  Sarahy Hameed    Chief Complaint:  Disease Management     History of Present Illness:  Pt is a 29 y.o. female w/  SLE.  She has continued plaquenil 200 mg twice daily. She never received her benlystaShe continues to have generalized joint pain in her wrists, elbows, knees, and low back. She is taking percocet tid prn for severe pain as well. She complains of intermittent rash on the face and eczema on her elbows and behind her knees. She denies oral/nasal ulcers, chest pain, shortness of breath, or raynauds. She complains of dry eyes and dry mouth. She is very fatigued and has a h/o gastric sleeve. She is also known anemia.            Rheumatologic History:   - Diagnosis/es:  - Positive serologies:  - Infectious screening labs:  - Previous Treatments:  - Current Treatments:     Interval History:   Hospitalization since last office visit: No    Patient Active Problem List    Diagnosis Date Noted    Systemic lupus erythematosus 01/16/2024     Past Surgical History:   Procedure Laterality Date    arm surgery Left 1998     Social History[1]  No family history on file.  Review of patient's allergies indicates:   Allergen Reactions    Tramadol        Review of Systems   Review of Systems   Constitutional:  Negative for activity change, appetite change, chills, diaphoresis, fatigue, fever and unexpected weight change.   HENT:  Negative for congestion, dental problem, ear discharge, ear pain, facial swelling, mouth sores, nosebleeds, postnasal drip, rhinorrhea, sinus pressure, sneezing, sore throat, tinnitus, trouble swallowing and voice change.    Eyes:  Negative for photophobia, pain, discharge, redness and itching.   Respiratory:  Negative for apnea, cough, chest tightness, shortness of breath and wheezing.    Cardiovascular:  Positive for leg swelling. Negative for chest pain and palpitations.   Gastrointestinal:  Negative for abdominal distention, abdominal pain, constipation, diarrhea,  nausea and vomiting.   Endocrine: Negative for cold intolerance, heat intolerance, polydipsia and polyuria.   Genitourinary:  Negative for decreased urine volume, difficulty urinating, dysuria, flank pain, frequency, hematuria and urgency.   Musculoskeletal:  Positive for arthralgias, back pain, gait problem, joint swelling, myalgias, neck pain and neck stiffness.   Skin:  Negative for pallor, rash and wound.   Allergic/Immunologic: Negative for immunocompromised state.   Neurological:  Negative for dizziness, tremors, weakness, numbness and headaches.   Hematological:  Negative for adenopathy. Does not bruise/bleed easily.   Psychiatric/Behavioral:  Negative for sleep disturbance. The patient is not nervous/anxious.         Current Medications:  Current Outpatient Medications   Medication Instructions    azithromycin (Z-ROCIO) 250 MG tablet Take 2 tablets by mouth on day 1; Take 1 tablet by mouth on days 2-5    BENLYSTA 200 mg, Subcutaneous, Every 7 days    busPIRone (BUSPAR) 10 mg, Oral, 2 times daily    cholecalciferol (vitamin D3) 5,000 Units, Every 7 days    ergocalciferol (ERGOCALCIFEROL) 50,000 Units, Oral, Every 7 days    ergocalciferol (ERGOCALCIFEROL) 50,000 Units, Oral, Every 72 hours    finasteride (PROSCAR) 5 mg, Oral, Daily    hydrocortisone acetate 1 % Crea Apply 1 a small amount to affected area twice a day as needed FOR ITCHING    hydroxychloroquine (PLAQUENIL) 200 mg, Oral, 2 times daily    hydroxychloroquine (PLAQUENIL) 200 mg, Oral, 2 times daily    levocetirizine (XYZAL) 5 mg, Oral, Nightly    minoxidiL (LONITEN) 2.5 mg, Oral, Daily    ondansetron (ZOFRAN-ODT) 4 mg, Oral, Every 8 hours PRN    oxyCODONE-acetaminophen (PERCOCET)  mg per tablet 1 tablet, Oral, Every 8 hours PRN    [START ON 7/21/2025] oxyCODONE-acetaminophen (PERCOCET)  mg per tablet 1 tablet, Oral, Every 8 hours PRN    [START ON 8/20/2025] oxyCODONE-acetaminophen (PERCOCET)  mg per tablet 1 tablet, Oral, Every 8  "hours PRN    triamcinolone acetonide 0.1% (KENALOG) 0.1 % ointment Apply topically to the affected area 2 (two) times daily.         Objective:     Vitals:    06/23/25 1503   BP: 131/82   Pulse: 63   Weight: 66 kg (145 lb 8.1 oz)   Height: 5' 2.4" (1.585 m)   PainSc:   8      Body mass index is 26.27 kg/m².     Physical Examinations:  Physical Exam   Constitutional: She is oriented to person, place, and time. No distress.   HENT:   Head: Normocephalic and atraumatic.   Eyes: Pupils are equal, round, and reactive to light. Right eye exhibits no discharge. Left eye exhibits no discharge.   Neck: No thyromegaly present.   Cardiovascular: Normal rate, regular rhythm and normal heart sounds. Exam reveals no gallop and no friction rub.   No murmur heard.  Pulmonary/Chest: Breath sounds normal. She has no wheezes. She has no rales. She exhibits no tenderness.   Abdominal: There is no abdominal tenderness. There is no rebound and no guarding.   Musculoskeletal:         General: No tenderness or deformity.      Right elbow: Normal.      Cervical back: Neck supple.      Right knee: Effusion present.      Left knee: Effusion present.   Lymphadenopathy:     She has no cervical adenopathy.   Neurological: She is alert and oriented to person, place, and time. Gait normal.   Skin: Skin is dry. No rash noted. No erythema. There is pallor.   Psychiatric: Mood and affect normal.   Vitals reviewed.      Right Side Rheumatological Exam     Examination finds the elbow normal.    The patient is tender to palpation of the 1st PIP, 1st MCP, 2nd PIP, 2nd MCP, 3rd PIP, 3rd MCP, 4th PIP, 4th MCP, 5th PIP and 5th MCP    She has swelling of the 1st PIP, 1st MCP, 2nd PIP, 2nd MCP, 3rd PIP, 3rd MCP, 4th PIP, 4th MCP, 5th PIP and 5th MCP    Shoulder Exam   Tenderness Location: no tenderness    Range of Motion   Active abduction:  abnormal   Adduction: abnormal  Sensation: normal    Knee Exam   Patellofemoral Crepitus: positive  Effusion: " positive  Sensation: normal    Hip Exam   Tenderness Location: posterior  Sensation: normal    Elbow/Wrist Exam   Tenderness Location: no tenderness  Sensation: normal    Left Side Rheumatological Exam     The patient is tender to palpation of the 1st PIP, 1st MCP, 2nd PIP, 2nd MCP, 3rd PIP, 3rd MCP, 4th PIP, 4th MCP, 5th PIP and 5th MCP.    She has swelling of the 1st PIP, 1st MCP, 2nd PIP, 2nd MCP, 3rd PIP, 3rd MCP, 4th PIP, 4th MCP, 5th PIP and 5th MCP    Shoulder Exam   Tenderness Location: no tenderness    Range of Motion   Active abduction:  abnormal   Sensation: normal    Knee Exam     Patellofemoral Crepitus: positive  Effusion: positive  Sensation: normal    Hip Exam   Tenderness Location: posterior  Sensation: normal    Elbow/Wrist Exam   Sensation: normal      Back/Neck Exam   General Inspection   Gait: normal            Disease Assessment Scores:  Patient's Global Assessment of arthritis (0-10): 2  Physician's Global Assessment of arthritis (0-10): 2  Number of Tender Joints (0-28): 3  Number of Swollen Joints (0-28): 3        5/22/2024     8:40 AM   Rapid3 Question Responses and Scores   MDHAQ Score 0   Psychologic Score 7.7   Pain Score 6.5   When you awakened in the morning OVER THE LAST WEEK, did you feel stiff? No   Fatigue Score 0   Global Health Score 2   RAPID3 Score 2.83       Monitoring Lab Results:  Lab Results   Component Value Date    WBC 4.07 10/22/2024    RBC 3.83 (L) 10/22/2024    HGB 9.0 (L) 10/22/2024    HCT 32.2 (L) 10/22/2024    MCV 84 10/22/2024    MCH 23.5 (L) 10/22/2024    MCHC 28.0 (L) 10/22/2024    RDW 14.8 (H) 10/22/2024     10/22/2024        Lab Results   Component Value Date     10/22/2024    K 4.0 10/22/2024     10/22/2024    CO2 26 10/22/2024    GLU 72 10/22/2024    BUN 10 10/22/2024    CREATININE 0.7 10/22/2024    CALCIUM 9.3 10/22/2024    PROT 8.5 (H) 10/22/2024    ALBUMIN 3.9 10/22/2024    BILITOT 0.3 10/22/2024    ALKPHOS 72 10/22/2024    AST 12  "10/22/2024    ALT 9 (L) 10/22/2024    ANIONGAP 7 (L) 10/22/2024    EGFRNORACEVR >60.0 10/22/2024       Lab Results   Component Value Date    SEDRATE 36 10/22/2024        Lab Results   Component Value Date    GJQNHDPP69KV 11 (L) 07/13/2023    PRPPLZTN28 >2000 (H) 07/13/2023        Lab Results   Component Value Date    CHOL 138 01/23/2023    HDL 45 01/23/2023    LDLCALC 74 01/23/2023    TRIG 100 01/23/2023       Lab Results   Component Value Date    CCPANTIBODIE 0.7 07/13/2023     Lab Results   Component Value Date    ANASCREEN Positive (A) 10/22/2024    ANATITER 1:160 10/22/2024    DSDNA Negative 1:10 10/22/2024     No results found for: "HLABB27"    Infectious Disease Screening:  Lab Results   Component Value Date    HEPBSAG Non-reactive 10/22/2024    HEPBCAB Non-reactive 10/22/2024    HEPBSAB <3.00 10/22/2024    HEPBSAB Non-reactive 10/22/2024    HEPBSURFABQU Negative 10/22/2024    HEPBSURFABQU <3 10/22/2024     Lab Results   Component Value Date    HEPCAB Non-reactive 10/22/2024     Lab Results   Component Value Date    TBGOLDPLUS Negative 10/22/2024     No results found for: "QUANTIFERON", "SVCMT", "QUANTAGVALUE", "QUANTNILVALU", "QUANTMITOGEN", "QFTTBAG", "QINT"     Imaging: DEXA, Xrays, MRIs, CTs, etc    Old & Outside Medical Records:  Reviewed old and all outside medical records available in Care Everywhere     Assessment:     Encounter Diagnoses   Name Primary?    Systemic lupus erythematosus, unspecified SLE type, unspecified organ involvement status Yes    KEMAR (juvenile idiopathic arthritis)     Chronic pain syndrome     Vitamin D deficiency     Alopecia     H/O gastric sleeve     Hypertension, unspecified type     Anemia, unspecified type     Annual physical exam     Anxiety     Allergy, sequela     Eczema, unspecified type       Plan:      Encounter Diagnoses   Name Primary?    Systemic lupus erythematosus, unspecified SLE type, unspecified organ involvement status Yes    KEMAR (juvenile idiopathic " arthritis)     Chronic pain syndrome     Vitamin D deficiency     Alopecia     H/O gastric sleeve     Hypertension, unspecified type     Anemia, unspecified type     Annual physical exam     Anxiety     Allergy, sequela     Eczema, unspecified type      Sarahy was seen today for disease management.    Diagnoses and all orders for this visit:    Systemic lupus erythematosus, unspecified SLE type, unspecified organ involvement status  -     Ambulatory referral/consult to Family Practice; Future  -     Sedimentation rate; Future  -     C-Reactive Protein; Future  -     Comprehensive Metabolic Panel; Future  -     CBC Auto Differential; Future  -     Transferrin; Future  -     C. trachomatis/N. gonorrhoeae by AMP DNA  -     belimumab (BENLYSTA) 200 mg/mL AtIn; Inject 1 mL (200 mg total) into the skin every 7 days.  -     hydroxychloroquine (PLAQUENIL) 200 mg tablet; Take 1 tablet (200 mg total) by mouth 2 (two) times daily.  -     oxyCODONE-acetaminophen (PERCOCET)  mg per tablet; Take 1 tablet by mouth every 8 (eight) hours as needed for Pain.  -     oxyCODONE-acetaminophen (PERCOCET)  mg per tablet; Take 1 tablet by mouth every 8 (eight) hours as needed for Pain.  -     oxyCODONE-acetaminophen (PERCOCET)  mg per tablet; Take 1 tablet by mouth every 8 (eight) hours as needed for Pain.  -     ondansetron (ZOFRAN-ODT) 4 MG TbDL; Take 1 tablet (4 mg total) by mouth every 8 (eight) hours as needed (n/v).  -     triamcinolone acetonide 0.1% (KENALOG) 0.1 % ointment; Apply topically to the affected area 2 (two) times daily.    KEMAR (juvenile idiopathic arthritis)  -     Ambulatory referral/consult to Family Practice; Future  -     C. trachomatis/N. gonorrhoeae by AMP DNA  -     oxyCODONE-acetaminophen (PERCOCET)  mg per tablet; Take 1 tablet by mouth every 8 (eight) hours as needed for Pain.  -     oxyCODONE-acetaminophen (PERCOCET)  mg per tablet; Take 1 tablet by mouth every 8 (eight) hours as  needed for Pain.  -     oxyCODONE-acetaminophen (PERCOCET)  mg per tablet; Take 1 tablet by mouth every 8 (eight) hours as needed for Pain.    Chronic pain syndrome  -     Ambulatory referral/consult to Family Practice; Future  -     oxyCODONE-acetaminophen (PERCOCET)  mg per tablet; Take 1 tablet by mouth every 8 (eight) hours as needed for Pain.  -     oxyCODONE-acetaminophen (PERCOCET)  mg per tablet; Take 1 tablet by mouth every 8 (eight) hours as needed for Pain.  -     oxyCODONE-acetaminophen (PERCOCET)  mg per tablet; Take 1 tablet by mouth every 8 (eight) hours as needed for Pain.    Vitamin D deficiency  -     Ambulatory referral/consult to Family Practice; Future  -     ergocalciferol (ERGOCALCIFEROL) 50,000 unit Cap; Take 1 capsule (50,000 Units total) by mouth every 72 hours.  -     Ferritin; Future  -     Iron and TIBC; Future  -     Vitamin D; Future  -     Zinc; Future  -     PTH, Intact; Future  -     Magnesium; Future  -     Phosphorus; Future  -     Folate; Future  -     Vitamin B12; Future  -     Vitamin B6; Future  -     Vitamin B1; Future    Alopecia  -     Ambulatory referral/consult to Family Practice; Future  -     ergocalciferol (ERGOCALCIFEROL) 50,000 unit Cap; Take 1 capsule (50,000 Units total) by mouth every 72 hours.  -     Ferritin; Future  -     Iron and TIBC; Future  -     Vitamin D; Future  -     Zinc; Future  -     PTH, Intact; Future  -     Magnesium; Future  -     Phosphorus; Future  -     Folate; Future  -     Vitamin B12; Future  -     Vitamin B6; Future  -     Vitamin B1; Future  -     C. trachomatis/N. gonorrhoeae by AMP DNA  -     minoxidiL (LONITEN) 2.5 MG tablet; Take 1 tablet (2.5 mg total) by mouth once daily.    H/O gastric sleeve  -     Ambulatory referral/consult to Family Practice; Future  -     ergocalciferol (ERGOCALCIFEROL) 50,000 unit Cap; Take 1 capsule (50,000 Units total) by mouth every 72 hours.  -     Ferritin; Future  -     Iron and  TIBC; Future  -     Vitamin D; Future  -     Zinc; Future  -     PTH, Intact; Future  -     Magnesium; Future  -     Phosphorus; Future  -     Folate; Future  -     Vitamin B12; Future  -     Vitamin B6; Future  -     Vitamin B1; Future    Hypertension, unspecified type  -     finasteride (PROSCAR) 5 mg tablet; Take 1 tablet (5 mg total) by mouth once daily.    Anemia, unspecified type  -     Sedimentation rate; Future  -     C-Reactive Protein; Future  -     Comprehensive Metabolic Panel; Future  -     CBC Auto Differential; Future  -     Transferrin; Future    Annual physical exam  -     Treponema Pallidium Antibodies IgG, IgM; Future  -     HIV 1/2 Ag/Ab (4th Gen); Future  -     HSV 1 & 2 AB(IgM),IFA w/rflx to Titer; Future  -     HSV 1 & 2 AB(IgM),IFA w/rflx to Titer  -     C. trachomatis/N. gonorrhoeae by AMP DNA    Anxiety  -     busPIRone (BUSPAR) 10 MG tablet; Take 1 tablet (10 mg total) by mouth 2 (two) times daily.    Allergy, sequela  -     levocetirizine (XYZAL) 5 MG tablet; Take 1 tablet (5 mg total) by mouth every evening.    Eczema, unspecified type  -     triamcinolone acetonide 0.1% (KENALOG) 0.1 % ointment; Apply topically to the affected area 2 (two) times daily.        1. Attempt to get benlysta again continue plaquenil  2. Labs ordered  3. F/u 4 months     Follow-up 4 months  Results for orders placed or performed in visit on 10/22/24   C3 Complement    Collection Time: 10/22/24  4:39 PM   Result Value Ref Range    Complement (C-3) 108 50 - 180 mg/dL   C4 Complement    Collection Time: 10/22/24  4:39 PM   Result Value Ref Range    Complement (C-4) 39 11 - 44 mg/dL   Transferrin    Collection Time: 10/22/24  4:39 PM   Result Value Ref Range    Transferrin 342 200 - 375 mg/dL   Iron and TIBC    Collection Time: 10/22/24  4:39 PM   Result Value Ref Range    Iron 14 (L) 30 - 160 ug/dL    Transferrin 342 200 - 375 mg/dL    TIBC 506 (H) 250 - 450 ug/dL    Saturated Iron 3 (L) 20 - 50 %   Sedimentation  rate    Collection Time: 10/22/24  4:39 PM   Result Value Ref Range    Sed Rate 36 0 - 36 mm/Hr   Sjogrens syndrome-B extractable nuclear antibody    Collection Time: 10/22/24  4:39 PM   Result Value Ref Range    Anti-SSB Antibody 0.09 0.00 - 0.99 Ratio    Anti-SSB Interpretation Negative Negative   Sjogrens syndrome-A extractable nuclear antibody    Collection Time: 10/22/24  4:39 PM   Result Value Ref Range    Anti-SSA Antibody 0.15 0.00 - 0.99 Ratio    Anti-SSA Interpretation Negative Negative   Comprehensive Metabolic Panel    Collection Time: 10/22/24  4:39 PM   Result Value Ref Range    Sodium 140 136 - 145 mmol/L    Potassium 4.0 3.5 - 5.1 mmol/L    Chloride 107 95 - 110 mmol/L    CO2 26 23 - 29 mmol/L    Glucose 72 70 - 110 mg/dL    BUN 10 6 - 20 mg/dL    Creatinine 0.7 0.5 - 1.4 mg/dL    Calcium 9.3 8.7 - 10.5 mg/dL    Total Protein 8.5 (H) 6.0 - 8.4 g/dL    Albumin 3.9 3.5 - 5.2 g/dL    Total Bilirubin 0.3 0.1 - 1.0 mg/dL    Alkaline Phosphatase 72 40 - 150 U/L    AST 12 10 - 40 U/L    ALT 9 (L) 10 - 44 U/L    eGFR >60.0 >60 mL/min/1.73 m^2    Anion Gap 7 (L) 8 - 16 mmol/L   CBC Auto Differential    Collection Time: 10/22/24  4:39 PM   Result Value Ref Range    WBC 4.07 3.90 - 12.70 K/uL    RBC 3.83 (L) 4.00 - 5.40 M/uL    Hemoglobin 9.0 (L) 12.0 - 16.0 g/dL    Hematocrit 32.2 (L) 37.0 - 48.5 %    MCV 84 82 - 98 fL    MCH 23.5 (L) 27.0 - 31.0 pg    MCHC 28.0 (L) 32.0 - 36.0 g/dL    RDW 14.8 (H) 11.5 - 14.5 %    Platelets 286 150 - 450 K/uL    MPV 12.5 9.2 - 12.9 fL    Immature Granulocytes 0.2 0.0 - 0.5 %    Gran # (ANC) 1.9 1.8 - 7.7 K/uL    Immature Grans (Abs) 0.01 0.00 - 0.04 K/uL    Lymph # 1.8 1.0 - 4.8 K/uL    Mono # 0.2 (L) 0.3 - 1.0 K/uL    Eos # 0.1 0.0 - 0.5 K/uL    Baso # 0.02 0.00 - 0.20 K/uL    nRBC 0 0 /100 WBC    Gran % 47.2 38.0 - 73.0 %    Lymph % 45.2 18.0 - 48.0 %    Mono % 4.4 4.0 - 15.0 %    Eosinophil % 2.5 0.0 - 8.0 %    Basophil % 0.5 0.0 - 1.9 %    Differential Method Automated     Anti-Smith Antibody    Collection Time: 10/22/24  4:39 PM   Result Value Ref Range    Anti Sm Antibody 0.11 0.00 - 0.99 Ratio    Anti-Sm Interpretation Negative Negative   Anti-Scleroderma Antibody    Collection Time: 10/22/24  4:39 PM   Result Value Ref Range    Scleroderma SCL- <0.6 <7.0 U/mL   Anti-Histone Antibody    Collection Time: 10/22/24  4:39 PM   Result Value Ref Range    Anti-Histone Antibody 0.6 0.0 - 0.9 Units   Anti-DNA Ab, Double-Stranded    Collection Time: 10/22/24  4:39 PM   Result Value Ref Range    ds DNA Ab Negative 1:10 Negative 1:10   Anti Sm/RNP Antibody    Collection Time: 10/22/24  4:39 PM   Result Value Ref Range    Anti Sm/RNP Antibody 0.11 0.00 - 0.99 Ratio    Anti-Sm/RNP Interpretation Negative Negative   WINSOME Screen w/Reflex    Collection Time: 10/22/24  4:39 PM   Result Value Ref Range    WINSOME Screen Positive (A) Negative <1:80   Hepatitis C Antibody    Collection Time: 10/22/24  4:39 PM   Result Value Ref Range    Hepatitis C Ab Non-reactive Non-reactive   Quantiferon Gold TB    Collection Time: 10/22/24  4:39 PM   Result Value Ref Range    NIL 0.49257 IU/mL    TB1 - Nil 0.027 IU/mL    TB2 - Nil 0.025 IU/mL    Mitogen - Nil 9.966 IU/mL    TB Gold Plus Negative Negative   Hepatitis B Surface Antigen    Collection Time: 10/22/24  4:39 PM   Result Value Ref Range    Hepatitis B Surface Ag Non-reactive Non-reactive   Hepatitis B Surface Antibody, Qual/Quant    Collection Time: 10/22/24  4:39 PM   Result Value Ref Range    Hep. B Surf Ab, Qual Negative     Hep. B Surf Ab, Quant. <3 mIU/mL   Hepatitis B Core Antibody, Total    Collection Time: 10/22/24  4:39 PM   Result Value Ref Range    Hep B Core Total Ab Non-reactive Non-reactive   Hepatitis B Surface Ab, Qualitative    Collection Time: 10/22/24  4:39 PM   Result Value Ref Range    Hep B S Ab <3.00 mIU/mL    Hep B S Ab Non-reactive    DNA Titer    Collection Time: 10/22/24  4:39 PM   Result Value Ref Range    DNA Titer 0    WINSOME  Pattern 2    Collection Time: 10/22/24  4:39 PM   Result Value Ref Range    WINSOME Pattern 2 Nucleolar    WINSOME Titer 1    Collection Time: 10/22/24  4:39 PM   Result Value Ref Range    WINSOME Titer 1 1:160    WINSOME Pattern 1    Collection Time: 10/22/24  4:39 PM   Result Value Ref Range    WINSOME PATTERN 1 Homogenous    WINSOME Titer 2    Collection Time: 10/22/24  4:39 PM   Result Value Ref Range    WINSOME Titer 2 1:640    Urinalysis    Collection Time: 10/22/24  4:56 PM   Result Value Ref Range    Specimen UA Urine, Clean Catch     Color, UA Yellow Yellow, Straw, Sondra    Appearance, UA Clear Clear    pH, UA 8.0 5.0 - 8.0    Specific Gravity, UA 1.020 1.005 - 1.030    Protein, UA Negative Negative    Glucose, UA Negative Negative    Ketones, UA Negative Negative    Bilirubin (UA) Negative Negative    Occult Blood UA Negative Negative    Nitrite, UA Negative Negative    Leukocytes, UA Negative Negative             More than 50% of the  40 minute encounter was spent face to face counseling the patient regarding current status and future plan of care as well as side effects  of the medications. All questions were answered to patient's satisfaction also includes  non-face to face time preparing to see the patient (eg, review of tests), Obtaining and/or reviewing separately obtained history, Documenting clinical information in the electronic or other health record, Independently interpreting results            [1]   Social History  Tobacco Use    Smoking status: Never    Smokeless tobacco: Never   Substance Use Topics    Alcohol use: No    Drug use: No

## 2025-06-23 NOTE — LETTER
June 23, 2025    Sarahy Hameed  [id number] [facility] [id number]             [facility]  [address] [place] [address]  Phone: [phone]  Fax: [phone] Dear      To Whom it May Concern:     Sarahy Hameed was seen in my clinic on 06/23/2025. She may return to work on 06/24/2025.     Please excuse her from any classes or work missed.       If you have any questions or concerns, please don't hesitate to call.    Sincerely,          Mak Song MD

## 2025-06-24 LAB — PTH-INTACT SERPL-MCNC: 94.3 PG/ML (ref 9–77)

## 2025-06-26 LAB — W ZINC: 81 UG/DL

## 2025-06-27 LAB
W VITAMIN B1: 40 UG/L
W VITAMIN B6: 3 UG/L

## 2025-07-06 ENCOUNTER — RESULTS FOLLOW-UP (OUTPATIENT)
Dept: RHEUMATOLOGY | Facility: CLINIC | Age: 30
End: 2025-07-06

## 2025-07-10 ENCOUNTER — TELEPHONE (OUTPATIENT)
Dept: HEMATOLOGY/ONCOLOGY | Facility: CLINIC | Age: 30
End: 2025-07-10
Payer: MEDICAID

## 2025-07-23 DIAGNOSIS — Z90.3 H/O GASTRIC SLEEVE: ICD-10-CM

## 2025-07-23 DIAGNOSIS — L30.9 ECZEMA, UNSPECIFIED TYPE: ICD-10-CM

## 2025-07-23 DIAGNOSIS — I10 HYPERTENSION, UNSPECIFIED TYPE: ICD-10-CM

## 2025-07-23 DIAGNOSIS — T78.40XS ALLERGY, SEQUELA: ICD-10-CM

## 2025-07-23 DIAGNOSIS — F41.9 ANXIETY: ICD-10-CM

## 2025-07-23 DIAGNOSIS — J32.9 SINUSITIS, UNSPECIFIED CHRONICITY, UNSPECIFIED LOCATION: ICD-10-CM

## 2025-07-23 DIAGNOSIS — G89.4 CHRONIC PAIN SYNDROME: ICD-10-CM

## 2025-07-23 DIAGNOSIS — E55.9 VITAMIN D DEFICIENCY: ICD-10-CM

## 2025-07-23 DIAGNOSIS — M32.9 SYSTEMIC LUPUS ERYTHEMATOSUS, UNSPECIFIED SLE TYPE, UNSPECIFIED ORGAN INVOLVEMENT STATUS: ICD-10-CM

## 2025-07-23 DIAGNOSIS — M08.80 JIA (JUVENILE IDIOPATHIC ARTHRITIS): ICD-10-CM

## 2025-07-23 DIAGNOSIS — L65.9 ALOPECIA: ICD-10-CM

## 2025-07-24 ENCOUNTER — TELEPHONE (OUTPATIENT)
Dept: HEMATOLOGY/ONCOLOGY | Facility: CLINIC | Age: 30
End: 2025-07-24
Payer: MEDICAID

## 2025-07-24 RX ORDER — OXYCODONE AND ACETAMINOPHEN 10; 325 MG/1; MG/1
1 TABLET ORAL EVERY 8 HOURS PRN
Qty: 90 TABLET | Refills: 0 | OUTPATIENT
Start: 2025-07-24 | End: 2025-08-23

## 2025-07-24 RX ORDER — TRIAMCINOLONE ACETONIDE 1 MG/G
OINTMENT TOPICAL 2 TIMES DAILY
Qty: 80 G | Refills: 6 | Status: SHIPPED | OUTPATIENT
Start: 2025-07-24

## 2025-07-24 RX ORDER — HYDROXYCHLOROQUINE SULFATE 200 MG/1
200 TABLET, FILM COATED ORAL 2 TIMES DAILY
Qty: 60 TABLET | Refills: 5 | Status: SHIPPED | OUTPATIENT
Start: 2025-07-24 | End: 2026-01-20

## 2025-07-24 RX ORDER — AZITHROMYCIN 250 MG/1
TABLET, FILM COATED ORAL
Qty: 6 TABLET | Refills: 0 | Status: SHIPPED | OUTPATIENT
Start: 2025-07-24

## 2025-07-24 RX ORDER — LEVOCETIRIZINE DIHYDROCHLORIDE 5 MG/1
5 TABLET, FILM COATED ORAL NIGHTLY
Qty: 30 TABLET | Refills: 11 | Status: SHIPPED | OUTPATIENT
Start: 2025-07-24

## 2025-07-24 RX ORDER — ONDANSETRON 4 MG/1
4 TABLET, ORALLY DISINTEGRATING ORAL EVERY 8 HOURS PRN
Qty: 40 TABLET | Refills: 6 | Status: SHIPPED | OUTPATIENT
Start: 2025-07-24

## 2025-07-24 RX ORDER — HYDROXYCHLOROQUINE SULFATE 200 MG/1
200 TABLET, FILM COATED ORAL 2 TIMES DAILY
Qty: 60 TABLET | Refills: 5 | OUTPATIENT
Start: 2025-07-24 | End: 2026-01-20

## 2025-07-24 RX ORDER — ERGOCALCIFEROL 1.25 MG/1
50000 CAPSULE ORAL
Qty: 30 CAPSULE | Refills: 1 | Status: SHIPPED | OUTPATIENT
Start: 2025-07-24

## 2025-07-24 RX ORDER — BUSPIRONE HYDROCHLORIDE 10 MG/1
10 TABLET ORAL 2 TIMES DAILY
Qty: 60 TABLET | Refills: 12 | Status: SHIPPED | OUTPATIENT
Start: 2025-07-24

## 2025-07-24 RX ORDER — FINASTERIDE 5 MG/1
5 TABLET, FILM COATED ORAL DAILY
Qty: 30 TABLET | Refills: 11 | Status: SHIPPED | OUTPATIENT
Start: 2025-07-24 | End: 2026-07-24

## 2025-07-24 RX ORDER — MINOXIDIL 2.5 MG/1
2.5 TABLET ORAL DAILY
Qty: 30 TABLET | Refills: 11 | Status: SHIPPED | OUTPATIENT
Start: 2025-07-24

## 2025-07-24 RX ORDER — ERGOCALCIFEROL 1.25 MG/1
50000 CAPSULE ORAL
Qty: 4 CAPSULE | Refills: 6 | Status: SHIPPED | OUTPATIENT
Start: 2025-07-24

## 2025-07-30 ENCOUNTER — TELEPHONE (OUTPATIENT)
Dept: HEMATOLOGY/ONCOLOGY | Facility: CLINIC | Age: 30
End: 2025-07-30
Payer: MEDICAID

## 2025-08-26 ENCOUNTER — E-VISIT (OUTPATIENT)
Dept: FAMILY MEDICINE | Facility: CLINIC | Age: 30
End: 2025-08-26
Payer: MEDICAID

## 2025-08-26 ENCOUNTER — E-VISIT (OUTPATIENT)
Dept: URGENT CARE | Facility: CLINIC | Age: 30
End: 2025-08-26
Payer: MEDICAID

## 2025-08-26 DIAGNOSIS — R05.2 SUBACUTE COUGH: Primary | ICD-10-CM

## 2025-08-26 DIAGNOSIS — N76.0 ACUTE VAGINITIS: Primary | ICD-10-CM

## 2025-08-26 DIAGNOSIS — J32.0 MAXILLARY SINUSITIS, UNSPECIFIED CHRONICITY: ICD-10-CM

## 2025-08-26 RX ORDER — FLUCONAZOLE 150 MG/1
150 TABLET ORAL DAILY
Qty: 1 TABLET | Refills: 0 | Status: SHIPPED | OUTPATIENT
Start: 2025-08-26 | End: 2025-08-27

## 2025-08-26 RX ORDER — METRONIDAZOLE 500 MG/1
500 TABLET ORAL EVERY 12 HOURS
Qty: 14 TABLET | Refills: 0 | Status: SHIPPED | OUTPATIENT
Start: 2025-08-26 | End: 2025-09-02

## 2025-08-26 RX ORDER — AMOXICILLIN AND CLAVULANATE POTASSIUM 875; 125 MG/1; MG/1
1 TABLET, FILM COATED ORAL EVERY 12 HOURS
Qty: 14 TABLET | Refills: 0 | Status: SHIPPED | OUTPATIENT
Start: 2025-08-26 | End: 2025-09-02

## 2025-08-26 RX ORDER — AZELASTINE 1 MG/ML
2 SPRAY, METERED NASAL 2 TIMES DAILY
Qty: 30 ML | Refills: 0 | Status: SHIPPED | OUTPATIENT
Start: 2025-08-26 | End: 2026-08-26

## 2025-08-26 RX ORDER — PROMETHAZINE HYDROCHLORIDE AND DEXTROMETHORPHAN HYDROBROMIDE 6.25; 15 MG/5ML; MG/5ML
5 SYRUP ORAL EVERY 6 HOURS PRN
Qty: 118 ML | Refills: 0 | Status: SHIPPED | OUTPATIENT
Start: 2025-08-26 | End: 2025-09-02

## 2025-08-27 ENCOUNTER — E-VISIT (OUTPATIENT)
Dept: URGENT CARE | Facility: CLINIC | Age: 30
End: 2025-08-27
Payer: MEDICAID

## 2025-08-27 ENCOUNTER — TELEPHONE (OUTPATIENT)
Dept: INTERNAL MEDICINE | Facility: CLINIC | Age: 30
End: 2025-08-27
Payer: MEDICAID

## 2025-08-27 DIAGNOSIS — R19.8 UMBILICAL DISCHARGE: ICD-10-CM

## 2025-08-27 DIAGNOSIS — L30.4 INTERTRIGO: Primary | ICD-10-CM

## 2025-08-27 RX ORDER — KETOCONAZOLE 20 MG/G
CREAM TOPICAL DAILY
Qty: 30 G | Refills: 0 | Status: SHIPPED | OUTPATIENT
Start: 2025-08-27

## 2025-09-04 DIAGNOSIS — N76.0 ACUTE VAGINITIS: ICD-10-CM

## 2025-09-05 RX ORDER — METRONIDAZOLE 500 MG/1
500 TABLET ORAL EVERY 12 HOURS
Qty: 14 TABLET | Refills: 0 | OUTPATIENT
Start: 2025-09-05 | End: 2025-09-12